# Patient Record
Sex: MALE | Race: BLACK OR AFRICAN AMERICAN | NOT HISPANIC OR LATINO | Employment: OTHER | URBAN - METROPOLITAN AREA
[De-identification: names, ages, dates, MRNs, and addresses within clinical notes are randomized per-mention and may not be internally consistent; named-entity substitution may affect disease eponyms.]

---

## 2017-08-17 ENCOUNTER — OUTSIDE FACILITY SERVICE (OUTPATIENT)
Dept: GASTROENTEROLOGY | Facility: CLINIC | Age: 67
End: 2017-08-17

## 2017-08-17 PROCEDURE — G0105 COLORECTAL SCRN; HI RISK IND: HCPCS | Performed by: INTERNAL MEDICINE

## 2017-08-17 PROCEDURE — G0500 MOD SEDAT ENDO SERVICE >5YRS: HCPCS | Performed by: INTERNAL MEDICINE

## 2017-11-17 ENCOUNTER — HOSPITAL ENCOUNTER (OUTPATIENT)
Dept: GENERAL RADIOLOGY | Facility: HOSPITAL | Age: 67
Discharge: HOME | End: 2017-11-17
Attending: FAMILY MEDICINE | Admitting: FAMILY MEDICINE

## 2017-11-17 ENCOUNTER — TRANSCRIBE ORDERS (OUTPATIENT)
Dept: ADMINISTRATIVE | Facility: HOSPITAL | Age: 67
End: 2017-11-17

## 2017-11-17 DIAGNOSIS — M54.9 BACK PAIN WITHOUT RADICULOPATHY: ICD-10-CM

## 2017-11-17 DIAGNOSIS — M25.551 ACUTE RIGHT HIP PAIN: ICD-10-CM

## 2017-11-17 DIAGNOSIS — M25.551 ACUTE RIGHT HIP PAIN: Primary | ICD-10-CM

## 2017-11-17 PROCEDURE — 73502 X-RAY EXAM HIP UNI 2-3 VIEWS: CPT

## 2017-11-17 PROCEDURE — 72110 X-RAY EXAM L-2 SPINE 4/>VWS: CPT

## 2019-03-07 ENCOUNTER — TRANSCRIBE ORDERS (OUTPATIENT)
Dept: ADMINISTRATIVE | Facility: HOSPITAL | Age: 69
End: 2019-03-07

## 2019-03-07 DIAGNOSIS — R94.39 ABNORMAL STRESS TEST: Primary | ICD-10-CM

## 2019-03-08 ENCOUNTER — HOSPITAL ENCOUNTER (OUTPATIENT)
Facility: HOSPITAL | Age: 69
LOS: 1 days | Discharge: HOME | End: 2019-03-09
Attending: INTERNAL MEDICINE | Admitting: INTERNAL MEDICINE

## 2019-03-08 DIAGNOSIS — R94.39 ABNORMAL STRESS TEST: ICD-10-CM

## 2019-03-08 LAB
ANION GAP SERPL CALCULATED.3IONS-SCNC: 9 MMOL/L (ref 3–11)
BUN BLD-MCNC: 29 MG/DL (ref 9–23)
BUN/CREAT SERPL: 19.7 (ref 7–25)
CALCIUM SPEC-SCNC: 9.1 MG/DL (ref 8.7–10.4)
CHLORIDE SERPL-SCNC: 100 MMOL/L (ref 99–109)
CO2 SERPL-SCNC: 28 MMOL/L (ref 20–31)
CREAT BLD-MCNC: 1.47 MG/DL (ref 0.6–1.3)
DEPRECATED RDW RBC AUTO: 41.5 FL (ref 37–54)
ERYTHROCYTE [DISTWIDTH] IN BLOOD BY AUTOMATED COUNT: 15.5 % (ref 11.3–14.5)
GFR SERPL CREATININE-BSD FRML MDRD: 58 ML/MIN/1.73
GLUCOSE BLD-MCNC: 160 MG/DL (ref 70–100)
GLUCOSE BLDC GLUCOMTR-MCNC: 107 MG/DL (ref 70–130)
GLUCOSE BLDC GLUCOMTR-MCNC: 163 MG/DL (ref 70–130)
GLUCOSE BLDC GLUCOMTR-MCNC: 181 MG/DL (ref 70–130)
HCT VFR BLD AUTO: 36.2 % (ref 38.9–50.9)
HGB BLD-MCNC: 11.2 G/DL (ref 13.1–17.5)
MCH RBC QN AUTO: 23 PG (ref 27–31)
MCHC RBC AUTO-ENTMCNC: 30.9 G/DL (ref 32–36)
MCV RBC AUTO: 74.3 FL (ref 80–99)
PLATELET # BLD AUTO: 247 10*3/MM3 (ref 150–450)
PMV BLD AUTO: 10.6 FL (ref 6–12)
POTASSIUM BLD-SCNC: 4 MMOL/L (ref 3.5–5.5)
RBC # BLD AUTO: 4.87 10*6/MM3 (ref 4.2–5.76)
SODIUM BLD-SCNC: 137 MMOL/L (ref 132–146)
WBC NRBC COR # BLD: 6.82 10*3/MM3 (ref 3.5–10.8)

## 2019-03-08 PROCEDURE — 36415 COLL VENOUS BLD VENIPUNCTURE: CPT

## 2019-03-08 PROCEDURE — A9270 NON-COVERED ITEM OR SERVICE: HCPCS | Performed by: INTERNAL MEDICINE

## 2019-03-08 PROCEDURE — 93459 L HRT ART/GRFT ANGIO: CPT | Performed by: INTERNAL MEDICINE

## 2019-03-08 PROCEDURE — 63710000001 LEVOTHYROXINE 75 MCG TABLET: Performed by: INTERNAL MEDICINE

## 2019-03-08 PROCEDURE — 63710000001 TERAZOSIN 1 MG CAPSULE: Performed by: INTERNAL MEDICINE

## 2019-03-08 PROCEDURE — C1769 GUIDE WIRE: HCPCS | Performed by: INTERNAL MEDICINE

## 2019-03-08 PROCEDURE — 0 IOPAMIDOL PER 1 ML: Performed by: INTERNAL MEDICINE

## 2019-03-08 PROCEDURE — C9604 PERC D-E COR REVASC T CABG S: HCPCS | Performed by: INTERNAL MEDICINE

## 2019-03-08 PROCEDURE — 92937 PRQ TRLUML REVSC CAB GRF 1: CPT | Performed by: INTERNAL MEDICINE

## 2019-03-08 PROCEDURE — 93005 ELECTROCARDIOGRAM TRACING: CPT | Performed by: INTERNAL MEDICINE

## 2019-03-08 PROCEDURE — 80048 BASIC METABOLIC PNL TOTAL CA: CPT | Performed by: INTERNAL MEDICINE

## 2019-03-08 PROCEDURE — C1725 CATH, TRANSLUMIN NON-LASER: HCPCS | Performed by: INTERNAL MEDICINE

## 2019-03-08 PROCEDURE — 63710000001 GABAPENTIN 300 MG CAPSULE: Performed by: INTERNAL MEDICINE

## 2019-03-08 PROCEDURE — 25010000002 BIVALIRUDIN 5 MG/ML: Performed by: INTERNAL MEDICINE

## 2019-03-08 PROCEDURE — C1874 STENT, COATED/COV W/DEL SYS: HCPCS | Performed by: INTERNAL MEDICINE

## 2019-03-08 PROCEDURE — 63710000001 ATORVASTATIN 40 MG TABLET: Performed by: INTERNAL MEDICINE

## 2019-03-08 PROCEDURE — 85027 COMPLETE CBC AUTOMATED: CPT | Performed by: INTERNAL MEDICINE

## 2019-03-08 PROCEDURE — 63710000001 PANTOPRAZOLE 40 MG TABLET DELAYED-RELEASE: Performed by: INTERNAL MEDICINE

## 2019-03-08 PROCEDURE — C1894 INTRO/SHEATH, NON-LASER: HCPCS | Performed by: INTERNAL MEDICINE

## 2019-03-08 PROCEDURE — 25010000002 FENTANYL CITRATE (PF) 100 MCG/2ML SOLUTION: Performed by: INTERNAL MEDICINE

## 2019-03-08 PROCEDURE — C1887 CATHETER, GUIDING: HCPCS | Performed by: INTERNAL MEDICINE

## 2019-03-08 PROCEDURE — 63710000001 OXYBUTYNIN XL 10 MG TABLET SUSTAINED-RELEASE 24 HOUR: Performed by: INTERNAL MEDICINE

## 2019-03-08 PROCEDURE — A9270 NON-COVERED ITEM OR SERVICE: HCPCS | Performed by: PHYSICIAN ASSISTANT

## 2019-03-08 PROCEDURE — 63710000001 INSULIN LISPRO (HUMAN) PER 5 UNITS: Performed by: PHYSICIAN ASSISTANT

## 2019-03-08 PROCEDURE — 82962 GLUCOSE BLOOD TEST: CPT

## 2019-03-08 DEVICE — XIENCE SIERRA™ EVEROLIMUS ELUTING CORONARY STENT SYSTEM 4.00 MM X 23 MM / RAPID-EXCHANGE
Type: IMPLANTABLE DEVICE | Status: FUNCTIONAL
Brand: XIENCE SIERRA™

## 2019-03-08 DEVICE — XIENCE SIERRA™ EVEROLIMUS ELUTING CORONARY STENT SYSTEM 4.00 MM X 28 MM / RAPID-EXCHANGE
Type: IMPLANTABLE DEVICE | Status: FUNCTIONAL
Brand: XIENCE SIERRA™

## 2019-03-08 RX ORDER — TERAZOSIN 1 MG/1
1 CAPSULE ORAL NIGHTLY
Status: DISCONTINUED | OUTPATIENT
Start: 2019-03-08 | End: 2019-03-09 | Stop reason: HOSPADM

## 2019-03-08 RX ORDER — MORPHINE SULFATE 2 MG/ML
1 INJECTION, SOLUTION INTRAMUSCULAR; INTRAVENOUS EVERY 4 HOURS PRN
Status: DISCONTINUED | OUTPATIENT
Start: 2019-03-08 | End: 2019-03-09 | Stop reason: HOSPADM

## 2019-03-08 RX ORDER — NALOXONE HCL 0.4 MG/ML
0.4 VIAL (ML) INJECTION
Status: DISCONTINUED | OUTPATIENT
Start: 2019-03-08 | End: 2019-03-09 | Stop reason: HOSPADM

## 2019-03-08 RX ORDER — OMEPRAZOLE 20 MG/1
20 CAPSULE, DELAYED RELEASE ORAL DAILY
COMMUNITY

## 2019-03-08 RX ORDER — ASPIRIN 325 MG
325 TABLET, DELAYED RELEASE (ENTERIC COATED) ORAL DAILY
Status: DISCONTINUED | OUTPATIENT
Start: 2019-03-08 | End: 2019-03-09 | Stop reason: HOSPADM

## 2019-03-08 RX ORDER — SODIUM CHLORIDE 9 MG/ML
250 INJECTION, SOLUTION INTRAVENOUS ONCE AS NEEDED
Status: DISCONTINUED | OUTPATIENT
Start: 2019-03-08 | End: 2019-03-09 | Stop reason: HOSPADM

## 2019-03-08 RX ORDER — IBUPROFEN 600 MG/1
600 TABLET ORAL EVERY 6 HOURS PRN
COMMUNITY

## 2019-03-08 RX ORDER — GABAPENTIN 300 MG/1
600 CAPSULE ORAL NIGHTLY
Status: DISCONTINUED | OUTPATIENT
Start: 2019-03-08 | End: 2019-03-09 | Stop reason: HOSPADM

## 2019-03-08 RX ORDER — TEMAZEPAM 7.5 MG/1
7.5 CAPSULE ORAL NIGHTLY PRN
Status: DISCONTINUED | OUTPATIENT
Start: 2019-03-08 | End: 2019-03-09 | Stop reason: HOSPADM

## 2019-03-08 RX ORDER — CLOPIDOGREL BISULFATE 75 MG/1
75 TABLET ORAL DAILY
Qty: 30 TABLET | Refills: 11 | Status: SHIPPED | OUTPATIENT
Start: 2019-03-09 | End: 2022-04-18 | Stop reason: HOSPADM

## 2019-03-08 RX ORDER — LEVOTHYROXINE SODIUM 0.07 MG/1
75 TABLET ORAL DAILY
Status: DISCONTINUED | OUTPATIENT
Start: 2019-03-08 | End: 2019-03-09 | Stop reason: HOSPADM

## 2019-03-08 RX ORDER — ASPIRIN 81 MG/1
81 TABLET ORAL DAILY
Status: DISCONTINUED | OUTPATIENT
Start: 2019-03-08 | End: 2019-03-08

## 2019-03-08 RX ORDER — SPIRONOLACTONE 50 MG/1
50 TABLET, FILM COATED ORAL DAILY
COMMUNITY

## 2019-03-08 RX ORDER — OXYBUTYNIN CHLORIDE 10 MG/1
10 TABLET, EXTENDED RELEASE ORAL DAILY
COMMUNITY
End: 2022-08-04 | Stop reason: ALTCHOICE

## 2019-03-08 RX ORDER — GABAPENTIN 300 MG/1
600 CAPSULE ORAL NIGHTLY PRN
COMMUNITY

## 2019-03-08 RX ORDER — ATORVASTATIN CALCIUM 80 MG/1
80 TABLET, FILM COATED ORAL NIGHTLY
Qty: 30 TABLET | Refills: 11 | Status: SHIPPED | OUTPATIENT
Start: 2019-03-08

## 2019-03-08 RX ORDER — ALPRAZOLAM 0.25 MG/1
0.25 TABLET ORAL 3 TIMES DAILY PRN
Status: DISCONTINUED | OUTPATIENT
Start: 2019-03-08 | End: 2019-03-09 | Stop reason: HOSPADM

## 2019-03-08 RX ORDER — DEXTROSE MONOHYDRATE 25 G/50ML
25 INJECTION, SOLUTION INTRAVENOUS
Status: DISCONTINUED | OUTPATIENT
Start: 2019-03-08 | End: 2019-03-09 | Stop reason: HOSPADM

## 2019-03-08 RX ORDER — GLIMEPIRIDE 2 MG/1
2 TABLET ORAL
COMMUNITY

## 2019-03-08 RX ORDER — LEVOTHYROXINE SODIUM 0.07 MG/1
75 TABLET ORAL DAILY
COMMUNITY

## 2019-03-08 RX ORDER — LIDOCAINE HYDROCHLORIDE 10 MG/ML
INJECTION, SOLUTION EPIDURAL; INFILTRATION; INTRACAUDAL; PERINEURAL AS NEEDED
Status: DISCONTINUED | OUTPATIENT
Start: 2019-03-08 | End: 2019-03-08 | Stop reason: HOSPADM

## 2019-03-08 RX ORDER — OXYBUTYNIN CHLORIDE 5 MG/1
10 TABLET, EXTENDED RELEASE ORAL DAILY
Status: DISCONTINUED | OUTPATIENT
Start: 2019-03-08 | End: 2019-03-09 | Stop reason: HOSPADM

## 2019-03-08 RX ORDER — CLOPIDOGREL BISULFATE 75 MG/1
TABLET ORAL AS NEEDED
Status: DISCONTINUED | OUTPATIENT
Start: 2019-03-08 | End: 2019-03-08 | Stop reason: HOSPADM

## 2019-03-08 RX ORDER — FENTANYL CITRATE 50 UG/ML
INJECTION, SOLUTION INTRAMUSCULAR; INTRAVENOUS AS NEEDED
Status: DISCONTINUED | OUTPATIENT
Start: 2019-03-08 | End: 2019-03-08 | Stop reason: HOSPADM

## 2019-03-08 RX ORDER — ASPIRIN 81 MG/1
81 TABLET ORAL DAILY
COMMUNITY

## 2019-03-08 RX ORDER — PANTOPRAZOLE SODIUM 40 MG/1
40 TABLET, DELAYED RELEASE ORAL EVERY MORNING
Status: DISCONTINUED | OUTPATIENT
Start: 2019-03-08 | End: 2019-03-09 | Stop reason: HOSPADM

## 2019-03-08 RX ORDER — CLOPIDOGREL BISULFATE 75 MG/1
75 TABLET ORAL DAILY
Status: DISCONTINUED | OUTPATIENT
Start: 2019-03-08 | End: 2019-03-09 | Stop reason: HOSPADM

## 2019-03-08 RX ORDER — SODIUM CHLORIDE 9 MG/ML
3 INJECTION, SOLUTION INTRAVENOUS CONTINUOUS
Status: ACTIVE | OUTPATIENT
Start: 2019-03-08 | End: 2019-03-08

## 2019-03-08 RX ORDER — ACETAMINOPHEN 325 MG/1
650 TABLET ORAL EVERY 4 HOURS PRN
Status: DISCONTINUED | OUTPATIENT
Start: 2019-03-08 | End: 2019-03-09 | Stop reason: HOSPADM

## 2019-03-08 RX ORDER — NICOTINE POLACRILEX 4 MG
15 LOZENGE BUCCAL
Status: DISCONTINUED | OUTPATIENT
Start: 2019-03-08 | End: 2019-03-09 | Stop reason: HOSPADM

## 2019-03-08 RX ORDER — HYDROCODONE BITARTRATE AND ACETAMINOPHEN 5; 325 MG/1; MG/1
1 TABLET ORAL EVERY 4 HOURS PRN
Status: DISCONTINUED | OUTPATIENT
Start: 2019-03-08 | End: 2019-03-09 | Stop reason: HOSPADM

## 2019-03-08 RX ORDER — DOXAZOSIN MESYLATE 1 MG/1
1 TABLET ORAL NIGHTLY
COMMUNITY

## 2019-03-08 RX ORDER — ATORVASTATIN CALCIUM 40 MG/1
80 TABLET, FILM COATED ORAL NIGHTLY
Status: DISCONTINUED | OUTPATIENT
Start: 2019-03-08 | End: 2019-03-09 | Stop reason: HOSPADM

## 2019-03-08 RX ADMIN — ATORVASTATIN CALCIUM 80 MG: 40 TABLET, FILM COATED ORAL at 21:07

## 2019-03-08 RX ADMIN — INSULIN LISPRO 2 UNITS: 100 INJECTION, SOLUTION INTRAVENOUS; SUBCUTANEOUS at 17:04

## 2019-03-08 RX ADMIN — ASPIRIN 325 MG: 325 TABLET, COATED ORAL at 07:22

## 2019-03-08 RX ADMIN — LEVOTHYROXINE SODIUM 75 MCG: 75 TABLET ORAL at 10:26

## 2019-03-08 RX ADMIN — PANTOPRAZOLE SODIUM 40 MG: 40 TABLET, DELAYED RELEASE ORAL at 10:26

## 2019-03-08 RX ADMIN — TERAZOSIN HYDROCHLORIDE ANHYDROUS 1 MG: 1 CAPSULE ORAL at 21:06

## 2019-03-08 RX ADMIN — OXYBUTYNIN CHLORIDE 10 MG: 10 TABLET, EXTENDED RELEASE ORAL at 10:26

## 2019-03-08 RX ADMIN — INSULIN LISPRO 2 UNITS: 100 INJECTION, SOLUTION INTRAVENOUS; SUBCUTANEOUS at 21:07

## 2019-03-08 RX ADMIN — GABAPENTIN 600 MG: 300 CAPSULE ORAL at 21:07

## 2019-03-08 NOTE — PLAN OF CARE
Problem: Patient Care Overview  Goal: Plan of Care Review  Outcome: Ongoing (interventions implemented as appropriate)   03/08/19 1098   Coping/Psychosocial   Plan of Care Reviewed With patient   Plan of Care Review   Progress improving   OTHER   Outcome Summary Doing well s/p heart cath, right femoral site soft after manual pressure applied for a few minutes. Will continue to monitor.      Goal: Individualization and Mutuality  Outcome: Ongoing (interventions implemented as appropriate)    Goal: Discharge Needs Assessment  Outcome: Ongoing (interventions implemented as appropriate)    Goal: Interprofessional Rounds/Family Conf  Outcome: Ongoing (interventions implemented as appropriate)      Problem: Cardiac Catheterization (Diagnostic/Interventional) (Adult)  Goal: Signs and Symptoms of Listed Potential Problems Will be Absent, Minimized or Managed (Cardiac Catheterization)  Outcome: Ongoing (interventions implemented as appropriate)    Goal: Anesthesia/Sedation Recovery  Outcome: Ongoing (interventions implemented as appropriate)

## 2019-03-08 NOTE — H&P
Pre-Cardiac Catheterization Report  Cardiovascular Laboratory  [facility]      Patient:  Saulo Dahl  :  1950  PCP:  Han Ramirez MD  PHONE:  [phone]    DATE: 3/8/2019    BRIEF HPI:  Saulo Dahl is a 68 y.o. male with hypertension, hypercholesterolemia, diabetes mellitus, and known coronary artery disease.  He is post CABG from .  He is complaining of a 3-month history of chest pain which she describes as a heaviness.  He states that it can last hours and is associated with fatigue, shortness of breath, and dyspnea on exertion.  He recently underwent an abnormal stress test and now presents for left heart catheterization with possible intervention.    Cardiac Risk Factors:  advanced age (older than 55 for men, 65 for women), diabetes mellitus, dyslipidemia, family history of premature cardiovascular disease, hypertension, male gender, obesity (BMI >= 30 kg/m2)    Anginal class in last 2 weeks:  CCS class III    CHF Class in last 2 weeks:  NYHA Class II    Cardiogenic shock:  no    Cardiac arrest <24 hours:  no    Stress test within last 6 months:   yes   Details:    Previous cardiac catheterization:  yes  Details:     Previous CABG:  yes  Details:      Allergies:     IV contrast allergy:  no  Allergies   Allergen Reactions   • Lactose Intolerance (Gi) GI Intolerance       MEDICATIONS:  Prior to Admission medications    Medication Sig Start Date End Date Taking? Authorizing Provider   aspirin 81 MG EC tablet Take 81 mg by mouth Daily.   Yes Kuldeep Solis MD   Cholecalciferol (VITAMIN D PO) Take 1 capsule by mouth Daily.   Yes Kuldeep Solis MD   Cyanocobalamin 1000 MCG/ML kit Inject 1 dose as directed 1 (One) Time Per Week.   Yes Kuldeep Solis MD   doxazosin (CARDURA) 1 MG tablet Take 1 mg by mouth Every Night.   Yes Kuldeep Solis MD   gabapentin (NEURONTIN) 300 MG capsule Take 600 mg by mouth At Night As Needed.   Yes Kuldeep Solis MD    glimepiride (AMARYL) 2 MG tablet Take 2 mg by mouth Every Morning Before Breakfast.   Yes Kuldeep Solis MD   ibuprofen (ADVIL,MOTRIN) 600 MG tablet Take 600 mg by mouth Every 6 (Six) Hours As Needed for Mild Pain .   Yes Kuldeep Solis MD   levothyroxine (SYNTHROID, LEVOTHROID) 75 MCG tablet Take 75 mcg by mouth Daily.   Yes Kuldeep Solis MD   metFORMIN (GLUCOPHAGE) 500 MG tablet Take 500 mg by mouth 2 (Two) Times a Day With Meals.   Yes Kuldeep Solis MD   Omega-3 Fatty Acids (FISH OIL PO) Take 57 mg by mouth Daily.   Yes Kuldeep Solis MD   omeprazole (priLOSEC) 20 MG capsule Take 20 mg by mouth Daily.   Yes Kuldepe Solis MD   oxybutynin XL (DITROPAN-XL) 10 MG 24 hr tablet Take 10 mg by mouth Daily.   Yes Kuldeep Solis MD   spironolactone (ALDACTONE) 50 MG tablet Take 50 mg by mouth Daily.   Yes Kuldeep Solis MD       Past medical & surgical history, social and family history reviewed in the electronic medical record.    ROS:  Cardiovascular ROS: positive for - chest pain, dyspnea on exertion and shortness of breath    Physical Exam:    Vitals:   Vitals:    03/08/19 0632   BP: 149/80   Pulse:    Resp:    Temp:    SpO2: 96%          03/08/19  0630   Weight: 99.8 kg (220 lb 0.3 oz)       General Appearance:    Alert, cooperative, in no acute distress   Head:    Normocephalic, without obvious abnormality, atraumatic   Eyes:            Lids and lashes normal, conjunctivae and sclerae normal, no   icterus, no pallor, corneas clear, PERRLA   Ears:    Ears appear intact with no abnormalities noted   Neck:   No adenopathy, supple, trachea midline, no thyromegaly, no   carotid bruit, no JVD   Back:     No kyphosis present, no scoliosis present, range of motion normal   Lungs:     Clear to auscultation,respirations regular, even and                  unlabored    Heart:    Regular rhythm and normal rate, normal S1 and S2, no            murmur, no gallop, no rub,  no click   Chest Wall:    No abnormalities observed   Abdomen:     Normal bowel sounds, no masses, no organomegaly, soft        non-tender, non-distended, no guarding, no rebound                tenderness   Rectal:     Deferred   Extremities:   Moves all extremities well, no edema, no cyanosis, no             redness   Pulses:   Pulses palpable and equal bilaterally   Skin:   No bleeding, bruising or rash   Neurologic:   Cranial nerves 2 - 12 grossly intact, sensation intact     Barbaeu Test:  Left: Normal  (oxymetric Allens) Right: Not Assessed         Results from last 7 days   Lab Units 03/08/19  0634   WBC 10*3/mm3 6.82   HEMOGLOBIN g/dL 11.2*   HEMATOCRIT % 36.2*   PLATELETS 10*3/mm3 247     Lab Results   Component Value Date    AST 25 04/17/2015    ALT 25 04/17/2015           Impression      · Abnormal stress test    Plan     · Procedure to perform: [facility]  · Planned access: Left radial artery              FLACA Rollins  03/08/19  7:13 AM

## 2019-03-09 VITALS
OXYGEN SATURATION: 97 % | WEIGHT: 220.02 LBS | SYSTOLIC BLOOD PRESSURE: 141 MMHG | TEMPERATURE: 98 F | HEIGHT: 68 IN | BODY MASS INDEX: 33.35 KG/M2 | RESPIRATION RATE: 16 BRPM | HEART RATE: 60 BPM | DIASTOLIC BLOOD PRESSURE: 73 MMHG

## 2019-03-09 LAB
ANION GAP SERPL CALCULATED.3IONS-SCNC: 7 MMOL/L (ref 3–11)
BUN BLD-MCNC: 26 MG/DL (ref 9–23)
BUN/CREAT SERPL: 21.1 (ref 7–25)
CALCIUM SPEC-SCNC: 8.8 MG/DL (ref 8.7–10.4)
CHLORIDE SERPL-SCNC: 102 MMOL/L (ref 99–109)
CO2 SERPL-SCNC: 30 MMOL/L (ref 20–31)
CREAT BLD-MCNC: 1.23 MG/DL (ref 0.6–1.3)
GFR SERPL CREATININE-BSD FRML MDRD: 71 ML/MIN/1.73
GLUCOSE BLD-MCNC: 162 MG/DL (ref 70–100)
GLUCOSE BLDC GLUCOMTR-MCNC: 155 MG/DL (ref 70–130)
POTASSIUM BLD-SCNC: 4.4 MMOL/L (ref 3.5–5.5)
SODIUM BLD-SCNC: 139 MMOL/L (ref 132–146)

## 2019-03-09 PROCEDURE — A9270 NON-COVERED ITEM OR SERVICE: HCPCS | Performed by: INTERNAL MEDICINE

## 2019-03-09 PROCEDURE — 63710000001 OXYBUTYNIN XL 5 MG TABLET SUSTAINED-RELEASE 24 HOUR: Performed by: INTERNAL MEDICINE

## 2019-03-09 PROCEDURE — A9270 NON-COVERED ITEM OR SERVICE: HCPCS | Performed by: PHYSICIAN ASSISTANT

## 2019-03-09 PROCEDURE — 82962 GLUCOSE BLOOD TEST: CPT

## 2019-03-09 PROCEDURE — 63710000001 CLOPIDOGREL 75 MG TABLET: Performed by: INTERNAL MEDICINE

## 2019-03-09 PROCEDURE — 80048 BASIC METABOLIC PNL TOTAL CA: CPT | Performed by: PHYSICIAN ASSISTANT

## 2019-03-09 PROCEDURE — 63710000001 LEVOTHYROXINE 75 MCG TABLET: Performed by: INTERNAL MEDICINE

## 2019-03-09 PROCEDURE — 63710000001 PANTOPRAZOLE 40 MG TABLET DELAYED-RELEASE: Performed by: INTERNAL MEDICINE

## 2019-03-09 PROCEDURE — 63710000001 ASPIRIN 325 MG TABLET DELAYED-RELEASE: Performed by: PHYSICIAN ASSISTANT

## 2019-03-09 RX ADMIN — LEVOTHYROXINE SODIUM 75 MCG: 75 TABLET ORAL at 08:30

## 2019-03-09 RX ADMIN — CLOPIDOGREL BISULFATE 75 MG: 75 TABLET ORAL at 08:30

## 2019-03-09 RX ADMIN — OXYBUTYNIN CHLORIDE 10 MG: 10 TABLET, EXTENDED RELEASE ORAL at 08:30

## 2019-03-09 RX ADMIN — ASPIRIN 325 MG: 325 TABLET, COATED ORAL at 08:30

## 2019-03-09 RX ADMIN — PANTOPRAZOLE SODIUM 40 MG: 40 TABLET, DELAYED RELEASE ORAL at 06:32

## 2019-03-09 RX ADMIN — INSULIN LISPRO 2 UNITS: 100 INJECTION, SOLUTION INTRAVENOUS; SUBCUTANEOUS at 08:30

## 2019-03-09 NOTE — PLAN OF CARE
Problem: Patient Care Overview  Goal: Plan of Care Review  Outcome: Ongoing (interventions implemented as appropriate)   03/09/19 0519   Coping/Psychosocial   Plan of Care Reviewed With patient   Plan of Care Review   Progress improving   OTHER   Outcome Summary Pt rested well this shift. VSS. RA. Sinus sabrina. No reports of pain this shift. Right groin site CDI.

## 2019-03-09 NOTE — DISCHARGE SUMMARY
Date of Discharge:  3/9/2019              [facility]  Date of Admit: 3/8/2019    Han Ramirez MD      Discharge Diagnosis:  Abnormal stress test      Hospital Course: Mr. Dahl is a pleasant 68-year-old male who was admitted to [facility] on 3/8/2019 due to an abnormal stress test.  He underwent a heart catheterization and received 2 drug-eluting stents to the saphenous vein graft to the circumflex coronary artery.  He tolerated the procedure well, there were no complications.  Today he is denying any complaints and is therefore ready for discharge.    Procedures Performed  Procedure(s):  Left Heart Cath       Consults     No orders found for last 30 day(s).          Pertinent Test Results: angiography: BAYRON x 2 to SVG-CX  .      Discharge Physical Exam:    General Appearance No acute distress   Neck No adenopathy, supple, trachea midline, no JVD   Lungs Clear to auscultation,respirations regular, even and unlabored   Heart Regular rhythm and normal rate, normal S1 and S2, no murmur, no gallop, no rub, no click   Chest wall No abnormalities observed   Abdomen Normal bowel sounds, no masses, no hepatomegaly, soft   Extremities Moves all extremities well, no edema, no cyanosis, no redness, right groin stable   Neurological Alert and oriented x 3     Discharge Medications     Discharge Medications      New Medications      Instructions Start Date   atorvastatin 80 MG tablet  Commonly known as:  LIPITOR   80 mg, Oral, Nightly      clopidogrel 75 MG tablet  Commonly known as:  PLAVIX   75 mg, Oral, Daily         Continue These Medications      Instructions Start Date   aspirin 81 MG EC tablet   81 mg, Oral, Daily      doxazosin 1 MG tablet  Commonly known as:  CARDURA   1 mg, Oral, Nightly      FISH OIL PO   57 mg, Oral, Daily      gabapentin 300 MG capsule  Commonly known as:  NEURONTIN   600 mg, Oral, Nightly PRN      glimepiride 2 MG tablet  Commonly known as:  AMARYL   2 mg, Oral,  Every Morning Before Breakfast      ibuprofen 600 MG tablet  Commonly known as:  ADVIL,MOTRIN   600 mg, Oral, Every 6 Hours PRN      levothyroxine 75 MCG tablet  Commonly known as:  SYNTHROID, LEVOTHROID   75 mcg, Oral, Daily      metFORMIN 500 MG tablet  Commonly known as:  GLUCOPHAGE   500 mg, Oral, 2 Times Daily With Meals      omeprazole 20 MG capsule  Commonly known as:  priLOSEC   20 mg, Oral, Daily      oxybutynin XL 10 MG 24 hr tablet  Commonly known as:  DITROPAN-XL   10 mg, Oral, Daily      spironolactone 50 MG tablet  Commonly known as:  ALDACTONE   50 mg, Oral, Daily      VITAMIN D PO   1 capsule, Oral, Daily             Discharge Diet: cardiac/diabetic    Activity at Discharge: as tolerated    Discharge disposition: home    Condition on Discharge: stable    Follow-up Appointments  No future appointments.  Additional Instructions for the Follow-ups that You Need to Schedule     Discharge Follow-up with Specified Provider: Dr. Smith; 1 Month   As directed      To:  Dr. Smith    Follow Up:  1 Month                      FLACA Rollins  03/09/19  10:53 AM

## 2019-03-11 ENCOUNTER — DOCUMENTATION (OUTPATIENT)
Dept: CARDIAC REHAB | Facility: HOSPITAL | Age: 69
End: 2019-03-11

## 2019-04-09 ENCOUNTER — DOCUMENTATION (OUTPATIENT)
Dept: CARDIAC REHAB | Facility: HOSPITAL | Age: 69
End: 2019-04-09

## 2021-12-16 ENCOUNTER — HOSPITAL ENCOUNTER (OUTPATIENT)
Dept: GENERAL RADIOLOGY | Facility: HOSPITAL | Age: 71
Discharge: HOME | End: 2021-12-16
Admitting: FAMILY MEDICINE

## 2021-12-16 ENCOUNTER — TRANSCRIBE ORDERS (OUTPATIENT)
Dept: ADMINISTRATIVE | Facility: HOSPITAL | Age: 71
End: 2021-12-16

## 2021-12-16 DIAGNOSIS — M25.551 HIP PAIN, RIGHT: ICD-10-CM

## 2021-12-16 DIAGNOSIS — M25.551 HIP PAIN, RIGHT: Primary | ICD-10-CM

## 2021-12-16 PROCEDURE — 73522 X-RAY EXAM HIPS BI 3-4 VIEWS: CPT

## 2022-04-16 ENCOUNTER — APPOINTMENT (OUTPATIENT)
Dept: GENERAL RADIOLOGY | Facility: HOSPITAL | Age: 72
End: 2022-04-16

## 2022-04-16 ENCOUNTER — APPOINTMENT (OUTPATIENT)
Dept: CT IMAGING | Facility: HOSPITAL | Age: 72
End: 2022-04-16

## 2022-04-16 ENCOUNTER — HOSPITAL ENCOUNTER (OUTPATIENT)
Facility: HOSPITAL | Age: 72
Setting detail: OBSERVATION
Discharge: HOME | End: 2022-04-18
Attending: EMERGENCY MEDICINE | Admitting: INTERNAL MEDICINE

## 2022-04-16 ENCOUNTER — APPOINTMENT (OUTPATIENT)
Dept: MRI IMAGING | Facility: HOSPITAL | Age: 72
End: 2022-04-16

## 2022-04-16 DIAGNOSIS — R47.01 APHASIA: Primary | ICD-10-CM

## 2022-04-16 DIAGNOSIS — R51.9 NONINTRACTABLE HEADACHE, UNSPECIFIED CHRONICITY PATTERN, UNSPECIFIED HEADACHE TYPE: ICD-10-CM

## 2022-04-16 DIAGNOSIS — I49.9 IRREGULAR CARDIAC RHYTHM: ICD-10-CM

## 2022-04-16 DIAGNOSIS — H53.9 VISUAL DISTURBANCE: ICD-10-CM

## 2022-04-16 PROBLEM — I25.10 CAD (CORONARY ARTERY DISEASE): Status: ACTIVE | Noted: 2022-04-16

## 2022-04-16 PROBLEM — D64.9 ANEMIA: Status: ACTIVE | Noted: 2022-04-16

## 2022-04-16 PROBLEM — R79.89 ELEVATED TROPONIN: Status: ACTIVE | Noted: 2022-04-16

## 2022-04-16 PROBLEM — R77.8 ELEVATED TROPONIN: Status: ACTIVE | Noted: 2022-04-16

## 2022-04-16 PROBLEM — Z86.69 HX OF MIGRAINES: Status: ACTIVE | Noted: 2022-04-16

## 2022-04-16 PROBLEM — K21.9 GERD WITHOUT ESOPHAGITIS: Status: ACTIVE | Noted: 2022-04-16

## 2022-04-16 PROBLEM — I10 HTN (HYPERTENSION): Status: ACTIVE | Noted: 2022-04-16

## 2022-04-16 PROBLEM — E78.5 HLD (HYPERLIPIDEMIA): Status: ACTIVE | Noted: 2022-04-16

## 2022-04-16 PROBLEM — E11.9 T2DM (TYPE 2 DIABETES MELLITUS): Status: ACTIVE | Noted: 2022-04-16

## 2022-04-16 PROBLEM — E03.9 HYPOTHYROIDISM (ACQUIRED): Status: ACTIVE | Noted: 2022-04-16

## 2022-04-16 PROBLEM — R94.39 ABNORMAL STRESS TEST: Status: RESOLVED | Noted: 2019-03-07 | Resolved: 2022-04-16

## 2022-04-16 LAB
ALT SERPL W P-5'-P-CCNC: 23 U/L (ref 1–41)
APTT PPP: 30.4 SECONDS (ref 22–39)
AST SERPL-CCNC: 37 U/L (ref 1–40)
BASOPHILS # BLD AUTO: 0.04 10*3/MM3 (ref 0–0.2)
BASOPHILS NFR BLD AUTO: 0.7 % (ref 0–1.5)
BUN BLDA-MCNC: 17 MG/DL (ref 8–26)
CA-I BLDA-SCNC: 1.24 MMOL/L (ref 1.2–1.32)
CHLORIDE BLDA-SCNC: 99 MMOL/L (ref 98–109)
CO2 BLDA-SCNC: 27 MMOL/L (ref 24–29)
CREAT BLDA-MCNC: 1.1 MG/DL (ref 0.6–1.3)
CREAT BLDA-MCNC: 1.2 MG/DL (ref 0.6–1.3)
DEPRECATED RDW RBC AUTO: 40.4 FL (ref 37–54)
EGFRCR SERPLBLD CKD-EPI 2021: 63.9 ML/MIN/1.73
EOSINOPHIL # BLD AUTO: 0.3 10*3/MM3 (ref 0–0.4)
EOSINOPHIL NFR BLD AUTO: 5.1 % (ref 0.3–6.2)
ERYTHROCYTE [DISTWIDTH] IN BLOOD BY AUTOMATED COUNT: 15.8 % (ref 12.3–15.4)
GLUCOSE BLDC GLUCOMTR-MCNC: 122 MG/DL (ref 70–130)
HCT VFR BLD AUTO: 33.9 % (ref 37.5–51)
HCT VFR BLDA CALC: 38 % (ref 38–51)
HGB BLD-MCNC: 10.7 G/DL (ref 13–17.7)
HGB BLDA-MCNC: 12.9 G/DL (ref 12–17)
HOLD SPECIMEN: NORMAL
HYPOCHROMIA BLD QL: NORMAL
IMM GRANULOCYTES # BLD AUTO: 0.02 10*3/MM3 (ref 0–0.05)
IMM GRANULOCYTES NFR BLD AUTO: 0.3 % (ref 0–0.5)
LYMPHOCYTES # BLD AUTO: 3.46 10*3/MM3 (ref 0.7–3.1)
LYMPHOCYTES NFR BLD AUTO: 58.5 % (ref 19.6–45.3)
MCH RBC QN AUTO: 23 PG (ref 26.6–33)
MCHC RBC AUTO-ENTMCNC: 31.6 G/DL (ref 31.5–35.7)
MCV RBC AUTO: 72.7 FL (ref 79–97)
MONOCYTES # BLD AUTO: 0.41 10*3/MM3 (ref 0.1–0.9)
MONOCYTES NFR BLD AUTO: 6.9 % (ref 5–12)
NEUTROPHILS NFR BLD AUTO: 1.68 10*3/MM3 (ref 1.7–7)
NEUTROPHILS NFR BLD AUTO: 28.5 % (ref 42.7–76)
NRBC BLD AUTO-RTO: 0 /100 WBC (ref 0–0.2)
PA ADP PRP-ACNC: 290 PRU
PLAT MORPH BLD: NORMAL
PLATELET # BLD AUTO: 190 10*3/MM3 (ref 140–450)
PMV BLD AUTO: 11 FL (ref 6–12)
POTASSIUM BLDA-SCNC: 3.9 MMOL/L (ref 3.5–4.9)
RBC # BLD AUTO: 4.66 10*6/MM3 (ref 4.14–5.8)
SODIUM BLD-SCNC: 138 MMOL/L (ref 138–146)
TROPONIN T SERPL-MCNC: 0.04 NG/ML (ref 0–0.03)
TROPONIN T SERPL-MCNC: 0.04 NG/ML (ref 0–0.03)
WBC MORPH BLD: NORMAL
WBC NRBC COR # BLD: 5.91 10*3/MM3 (ref 3.4–10.8)
WHOLE BLOOD HOLD SPECIMEN: NORMAL
WHOLE BLOOD HOLD SPECIMEN: NORMAL

## 2022-04-16 PROCEDURE — 70450 CT HEAD/BRAIN W/O DYE: CPT

## 2022-04-16 PROCEDURE — 85652 RBC SED RATE AUTOMATED: CPT | Performed by: INTERNAL MEDICINE

## 2022-04-16 PROCEDURE — 80047 BASIC METABLC PNL IONIZED CA: CPT

## 2022-04-16 PROCEDURE — 93005 ELECTROCARDIOGRAM TRACING: CPT

## 2022-04-16 PROCEDURE — 0 IOPAMIDOL PER 1 ML: Performed by: EMERGENCY MEDICINE

## 2022-04-16 PROCEDURE — 99204 OFFICE O/P NEW MOD 45 MIN: CPT

## 2022-04-16 PROCEDURE — 70551 MRI BRAIN STEM W/O DYE: CPT

## 2022-04-16 PROCEDURE — G0378 HOSPITAL OBSERVATION PER HR: HCPCS

## 2022-04-16 PROCEDURE — 85007 BL SMEAR W/DIFF WBC COUNT: CPT | Performed by: EMERGENCY MEDICINE

## 2022-04-16 PROCEDURE — 99285 EMERGENCY DEPT VISIT HI MDM: CPT

## 2022-04-16 PROCEDURE — 84450 TRANSFERASE (AST) (SGOT): CPT | Performed by: EMERGENCY MEDICINE

## 2022-04-16 PROCEDURE — 70496 CT ANGIOGRAPHY HEAD: CPT

## 2022-04-16 PROCEDURE — 84460 ALANINE AMINO (ALT) (SGPT): CPT | Performed by: EMERGENCY MEDICINE

## 2022-04-16 PROCEDURE — 82565 ASSAY OF CREATININE: CPT

## 2022-04-16 PROCEDURE — 0042T HC CT CEREBRAL PERFUSION W/WO CONTRAST: CPT

## 2022-04-16 PROCEDURE — 85576 BLOOD PLATELET AGGREGATION: CPT

## 2022-04-16 PROCEDURE — 85014 HEMATOCRIT: CPT

## 2022-04-16 PROCEDURE — 84484 ASSAY OF TROPONIN QUANT: CPT | Performed by: NURSE PRACTITIONER

## 2022-04-16 PROCEDURE — 99220 PR INITIAL OBSERVATION CARE/DAY 70 MINUTES: CPT | Performed by: INTERNAL MEDICINE

## 2022-04-16 PROCEDURE — 70498 CT ANGIOGRAPHY NECK: CPT

## 2022-04-16 PROCEDURE — 85025 COMPLETE CBC W/AUTO DIFF WBC: CPT | Performed by: EMERGENCY MEDICINE

## 2022-04-16 PROCEDURE — 93005 ELECTROCARDIOGRAM TRACING: CPT | Performed by: EMERGENCY MEDICINE

## 2022-04-16 PROCEDURE — 71045 X-RAY EXAM CHEST 1 VIEW: CPT

## 2022-04-16 PROCEDURE — 84484 ASSAY OF TROPONIN QUANT: CPT | Performed by: EMERGENCY MEDICINE

## 2022-04-16 PROCEDURE — 85730 THROMBOPLASTIN TIME PARTIAL: CPT | Performed by: EMERGENCY MEDICINE

## 2022-04-16 RX ORDER — NICOTINE POLACRILEX 4 MG
15 LOZENGE BUCCAL
Status: DISCONTINUED | OUTPATIENT
Start: 2022-04-16 | End: 2022-04-18 | Stop reason: HOSPADM

## 2022-04-16 RX ORDER — SODIUM CHLORIDE 0.9 % (FLUSH) 0.9 %
10 SYRINGE (ML) INJECTION EVERY 12 HOURS SCHEDULED
Status: DISCONTINUED | OUTPATIENT
Start: 2022-04-16 | End: 2022-04-18 | Stop reason: HOSPADM

## 2022-04-16 RX ORDER — TERAZOSIN 1 MG/1
1 CAPSULE ORAL NIGHTLY
Status: DISCONTINUED | OUTPATIENT
Start: 2022-04-17 | End: 2022-04-18 | Stop reason: HOSPADM

## 2022-04-16 RX ORDER — SODIUM CHLORIDE 9 MG/ML
75 INJECTION, SOLUTION INTRAVENOUS CONTINUOUS
Status: ACTIVE | OUTPATIENT
Start: 2022-04-16 | End: 2022-04-17

## 2022-04-16 RX ORDER — CLOPIDOGREL BISULFATE 75 MG/1
75 TABLET ORAL DAILY
Status: DISCONTINUED | OUTPATIENT
Start: 2022-04-17 | End: 2022-04-17

## 2022-04-16 RX ORDER — SODIUM CHLORIDE 0.9 % (FLUSH) 0.9 %
10 SYRINGE (ML) INJECTION AS NEEDED
Status: DISCONTINUED | OUTPATIENT
Start: 2022-04-16 | End: 2022-04-18 | Stop reason: HOSPADM

## 2022-04-16 RX ORDER — LEVOTHYROXINE SODIUM 0.07 MG/1
75 TABLET ORAL
Status: DISCONTINUED | OUTPATIENT
Start: 2022-04-17 | End: 2022-04-18 | Stop reason: HOSPADM

## 2022-04-16 RX ORDER — ATORVASTATIN CALCIUM 40 MG/1
80 TABLET, FILM COATED ORAL NIGHTLY
Status: DISCONTINUED | OUTPATIENT
Start: 2022-04-16 | End: 2022-04-18 | Stop reason: HOSPADM

## 2022-04-16 RX ORDER — DEXTROSE MONOHYDRATE 25 G/50ML
25 INJECTION, SOLUTION INTRAVENOUS
Status: DISCONTINUED | OUTPATIENT
Start: 2022-04-16 | End: 2022-04-18 | Stop reason: HOSPADM

## 2022-04-16 RX ORDER — ACETAMINOPHEN 325 MG/1
650 TABLET ORAL EVERY 4 HOURS PRN
Status: DISCONTINUED | OUTPATIENT
Start: 2022-04-16 | End: 2022-04-18 | Stop reason: HOSPADM

## 2022-04-16 RX ORDER — OXYBUTYNIN CHLORIDE 10 MG/1
10 TABLET, EXTENDED RELEASE ORAL DAILY
Status: DISCONTINUED | OUTPATIENT
Start: 2022-04-17 | End: 2022-04-18 | Stop reason: HOSPADM

## 2022-04-16 RX ORDER — ASPIRIN 81 MG/1
81 TABLET, CHEWABLE ORAL DAILY
Status: DISCONTINUED | OUTPATIENT
Start: 2022-04-17 | End: 2022-04-18 | Stop reason: HOSPADM

## 2022-04-16 RX ORDER — ASPIRIN 300 MG/1
300 SUPPOSITORY RECTAL DAILY
Status: DISCONTINUED | OUTPATIENT
Start: 2022-04-17 | End: 2022-04-18 | Stop reason: HOSPADM

## 2022-04-16 RX ORDER — PANTOPRAZOLE SODIUM 40 MG/1
40 TABLET, DELAYED RELEASE ORAL EVERY MORNING
Status: DISCONTINUED | OUTPATIENT
Start: 2022-04-17 | End: 2022-04-18 | Stop reason: HOSPADM

## 2022-04-16 RX ADMIN — IOPAMIDOL 115 ML: 755 INJECTION, SOLUTION INTRAVENOUS at 18:24

## 2022-04-16 RX ADMIN — SODIUM CHLORIDE 75 ML/HR: 9 INJECTION, SOLUTION INTRAVENOUS at 21:20

## 2022-04-16 RX ADMIN — ATORVASTATIN CALCIUM 80 MG: 40 TABLET, FILM COATED ORAL at 21:26

## 2022-04-16 NOTE — ED PROVIDER NOTES
Subjective   Patient is a pleasant 72-year-old male who presents with concern for stroke.  He says history of migraine headaches and says that even since he was a child he has vision changes when he has migraine headaches.  He had a headache this morning did have associated vision changes but he says that this was worse than usual.  More concerningly and what prompted his visit to the emergency department was that was prior to arrival he went to his wife and she states that he was very confused and could not seem to comprehend what she was saying to him.  He would try to speak and it was a word salad rodriberish.  He has never had any symptoms like this associated with his complex migraines in the past.  Denies recent fever or unknown illness.  He feels that his symptoms have resolved at this time.    Denies fever chest pain, shortness of breath, abdominal pain, vomiting, or diarrhea.      Stroke  Last known well:  Last night.  Vision changes and headache upon waking.  Aphasia symptoms began at 1700.  Timing:  Rare  Progression:  Resolved  Previous similar episodes: Vision changes are consistent with previous episodes.  He has never had the aphasia symptoms.    Associated symptoms: no chest pain, no fall, no fever, no seizures and no vomiting        Review of Systems   Constitutional: Negative for fever.   Cardiovascular: Negative for chest pain.   Gastrointestinal: Negative for vomiting.   Neurological: Negative for seizures.   All other systems reviewed and are negative.      Past Medical History:   Diagnosis Date   • Coronary artery disease    • Diabetes mellitus (HCC)    • Disease of thyroid gland    • Hypercholesteremia    • Hypertension        Allergies   Allergen Reactions   • Lactose Intolerance (Gi) GI Intolerance       Past Surgical History:   Procedure Laterality Date   • ABDOMINAL HERNIA REPAIR     • CARDIAC CATHETERIZATION     • CARDIAC CATHETERIZATION N/A 3/8/2019    Procedure: Left Heart Cath;  Surgeon:  Jamshid Smith MD;  Location: [facility] INVASIVE LOCATION;  Service: Cardiology   • CATARACT EXTRACTION Right    • CIRCUMCISION     • CORONARY ARTERY BYPASS GRAFT     • TONSILLECTOMY     • TOTAL HIP ARTHROPLASTY Right        History reviewed. No pertinent family history.    Social History     Socioeconomic History   • Marital status:    Tobacco Use   • Smoking status: Never Smoker   • Smokeless tobacco: Never Used   Substance and Sexual Activity   • Alcohol use: No   • Drug use: No   • Sexual activity: Defer           Objective   Physical Exam  Vitals and nursing note reviewed.   Constitutional:       General: He is not in acute distress.  HENT:      Head: Normocephalic and atraumatic.   Eyes:      Conjunctiva/sclera: Conjunctivae normal.      Pupils: Pupils are equal, round, and reactive to light.   Cardiovascular:      Rate and Rhythm: Normal rate. Rhythm irregular.      Heart sounds: Normal heart sounds.   Pulmonary:      Effort: No respiratory distress.      Breath sounds: Normal breath sounds.   Abdominal:      Palpations: Abdomen is soft.      Tenderness: There is no abdominal tenderness.   Musculoskeletal:         General: Normal range of motion.      Cervical back: Normal range of motion and neck supple.   Skin:     General: Skin is warm and dry.   Neurological:      Mental Status: He is alert and oriented to person, place, and time.      Cranial Nerves: No cranial nerve deficit.      Sensory: No sensory deficit.      Comments: No pronator drift   Normal finger to nose and heel to shin    Psychiatric:         Mood and Affect: Mood normal.         Behavior: Behavior normal.         Procedures           ED Course      Recent Results (from the past 24 hour(s))   POC Creatinine    Collection Time: 04/16/22  6:10 PM    Specimen: Blood   Result Value Ref Range    Creatinine 1.10 0.60 - 1.30 mg/dL   Troponin    Collection Time: 04/16/22  6:12 PM    Specimen: Blood   Result Value Ref Range    Troponin  T 0.039 (C) 0.000 - 0.030 ng/mL   AST    Collection Time: 04/16/22  6:12 PM    Specimen: Blood   Result Value Ref Range    AST (SGOT) 37 1 - 40 U/L   ALT    Collection Time: 04/16/22  6:12 PM    Specimen: Blood   Result Value Ref Range    ALT (SGPT) 23 1 - 41 U/L   CBC Auto Differential    Collection Time: 04/16/22  6:12 PM    Specimen: Blood   Result Value Ref Range    WBC 5.91 3.40 - 10.80 10*3/mm3    RBC 4.66 4.14 - 5.80 10*6/mm3    Hemoglobin 10.7 (L) 13.0 - 17.7 g/dL    Hematocrit 33.9 (L) 37.5 - 51.0 %    MCV 72.7 (L) 79.0 - 97.0 fL    MCH 23.0 (L) 26.6 - 33.0 pg    MCHC 31.6 31.5 - 35.7 g/dL    RDW 15.8 (H) 12.3 - 15.4 %    RDW-SD 40.4 37.0 - 54.0 fl    MPV 11.0 6.0 - 12.0 fL    Platelets 190 140 - 450 10*3/mm3    nRBC 0.0 0.0 - 0.2 /100 WBC   POC CHEM 8    Collection Time: 04/16/22  6:17 PM    Specimen: Blood   Result Value Ref Range    Glucose 122 70 - 130 mg/dL    BUN 17 8 - 26 mg/dL    Creatinine 1.20 0.60 - 1.30 mg/dL    Sodium 138 138 - 146 mmol/L    POC Potassium 3.9 3.5 - 4.9 mmol/L    Chloride 99 98 - 109 mmol/L    Total CO2 27 24 - 29 mmol/L    Hemoglobin 12.9 12.0 - 17.0 g/dL    Hematocrit 38 38 - 51 %    Ionized Calcium 1.24 1.20 - 1.32 mmol/L    eGFR 63.9 >60.0 mL/min/1.73     Note: In addition to lab results from this visit, the labs listed above may include labs taken at another facility or during a different encounter within the last 24 hours. Please correlate lab times with ED admission and discharge times for further clarification of the services performed during this visit.    XR Chest 1 View   Final Result       1. No acute cardiopulmonary disease.           This report was finalized on 4/16/2022 6:41 PM by Jesse Bucio MD.          CT Angiogram Head w AI Analysis of LVO   Final Result       1.  Calcified plaque the left carotid bifurcation resulting in   approximately 50% stenosis of the left internal carotid artery at its   origin.   2.  No hemodynamically significant stenosis of the  "right carotid or   vertebral arteries.   3.  No intracranial vascular stenosis, occlusion, or aneurysm.   4.  No pathologic contrast enhancement.       This report was finalized on 4/16/2022 6:35 PM by Jesse Bucio MD.          CT CEREBRAL PERFUSION WITH & WITHOUT CONTRAST   Final Result           1.  No perfusion abnormality seen to suggest ischemia or core infarct.       This report was finalized on 4/16/2022 6:25 PM by Jesse Bucio MD.          CT Angiogram Neck   Final Result       1.  Calcified plaque the left carotid bifurcation resulting in   approximately 50% stenosis of the left internal carotid artery at its   origin.   2.  No hemodynamically significant stenosis of the right carotid or   vertebral arteries.   3.  No intracranial vascular stenosis, occlusion, or aneurysm.   4.  No pathologic contrast enhancement.       This report was finalized on 4/16/2022 6:35 PM by Jesse Bucio MD.          CT Head Without Contrast Stroke Protocol   Final Result       1. No acute intracranial abnormality.       Findings personally communicated to Dannielle with stroke team at 5:58 PM   on 4/16/2022       This report was finalized on 4/16/2022 6:02 PM by Jesse Bucio MD.          MRI Brain Without Contrast    (Results Pending)     Vitals:    04/16/22 1744   BP: 155/79   BP Location: Left arm   Patient Position: Sitting   Pulse: 84   Resp: 17   Temp: 96.8 °F (36 °C)   TempSrc: Oral   SpO2: 96%   Weight: 98 kg (216 lb)   Height: 172.7 cm (68\")     Medications   sodium chloride 0.9 % flush 10 mL (has no administration in time range)   iopamidol (ISOVUE-370) 76 % injection 115 mL (115 mL Intravenous Given 4/16/22 1824)     ECG/EMG Results (last 24 hours)     Procedure Component Value Units Date/Time    ECG 12 Lead [[id number]] Collected: 04/16/22 1749     Updated: 04/16/22 1753        ECG 12 Lead   Preliminary Result         ECG 12 Lead    (Results Pending)                                                  Pt does have " an irregular rhythm.  Rate normal.  The strip I reviewed looks for like sinus rhythm with PVC.  Further evaluation indicated.    MDM    Final diagnoses:   Aphasia   Nonintractable headache, unspecified chronicity pattern, unspecified headache type   Visual disturbance   Irregular cardiac rhythm       ED Disposition  ED Disposition     ED Disposition   Decision to Admit    Condition   --    Comment   Level of Care: Telemetry [5]   Diagnosis: Aphasia [784.3.ICD-9-CM]   Admitting Physician: MICHAEL SHORT [[id number]]   Attending Physician: MICHAEL SHORT [[id number]]               No follow-up provider specified.       Medication List      No changes were made to your prescriptions during this visit.          Db Bergman DO  04/17/22 1431

## 2022-04-16 NOTE — CONSULTS
"Stroke Consult Note    Patient Name: Saulo Dahl   MRN: [id number]  Age: 72 y.o.  Sex: male  : 1950    Primary Care Physician: Han Ramirez MD  Referring Physician: Dr. Isauro Bergman    TIME STROKE TEAM CALLED:  EST     TIME PATIENT SEEN:  EST    Handedness: Right  Race: -American    Chief Complaint/Reason for Consultation: Transient aphasia    HPI: Mr. Dahl is a 72-year-old -American male with known medical diagnosis of essential hypertension, hyperlipidemia, CAD s/p CABG, diabetes mellitus type 2, migraines, and obesity who presents to the emergency department [facility] for further evaluation of transient aphasia.  The patient tells me that he was in his normal state of health and at approximately 1700 went to talk to his wife and at this time his words were \"jumbled\".  He reports that he was unable to \"connect his thoughts\" and that he was unable to understand anything he was reading.  He states he could recognize letters but could not connect them to figure out what they spelt.  He denies experiencing any unilateral weakness, unilateral numbness, balance difficulties, or facial droop however does endorse a mild headache and blurry vision.  The patient tells me he has had migraines in the past however this is not his typical presentation.  He does take ASA 81 mg and Plavix 75 mg daily and is compliant with medications.    I spoke with his wife, who is currently at the bedside, and she confirms the above story.  She states that he came out behind her and was going to tell her something and had word salad.  She states that this episode lasted approximately 10 minutes before spontaneously resolving however they decided to come to the emergency department for further evaluation.  Reports that he has never had this happen before.    The patient denies family history of stroke/TIA or blood clotting disorders.    Last Known Normal Date/Time: 170 EST     Review of Systems "   Constitutional: Negative for activity change, chills, fatigue and fever.   Eyes: Positive for visual disturbance. Negative for photophobia.   Respiratory: Negative for cough, chest tightness and shortness of breath.    Cardiovascular: Negative for chest pain and palpitations.   Gastrointestinal: Negative for blood in stool, diarrhea, nausea and vomiting.   Endocrine: Negative.    Genitourinary: Negative for dysuria and hematuria.   Musculoskeletal: Positive for neck pain.   Skin: Negative.    Neurological: Positive for speech difficulty and headaches. Negative for dizziness, seizures, syncope, facial asymmetry, weakness and numbness.   Hematological: Negative.    Psychiatric/Behavioral: Negative.       Past Medical History:   Diagnosis Date   • Coronary artery disease    • Diabetes mellitus (CMS/HCC)    • Disease of thyroid gland    • Hypercholesteremia    • Hypertension      Past Surgical History:   Procedure Laterality Date   • ABDOMINAL HERNIA REPAIR     • CARDIAC CATHETERIZATION     • CARDIAC CATHETERIZATION N/A 3/8/2019    Procedure: Left Heart Cath;  Surgeon: Jamshid Smith MD;  Location: [facility] INVASIVE LOCATION;  Service: Cardiology   • CATARACT EXTRACTION Right    • CIRCUMCISION     • CORONARY ARTERY BYPASS GRAFT     • TONSILLECTOMY     • TOTAL HIP ARTHROPLASTY Right      No family history on file.  Social History     Socioeconomic History   • Marital status:    Tobacco Use   • Smoking status: Never Smoker   • Smokeless tobacco: Never Used   Substance and Sexual Activity   • Alcohol use: No   • Drug use: No   • Sexual activity: Defer     Allergies   Allergen Reactions   • Lactose Intolerance (Gi) GI Intolerance     Prior to Admission medications    Medication Sig Start Date End Date Taking? Authorizing Provider   aspirin 81 MG EC tablet Take 81 mg by mouth Daily.    Provider, MD Kuldeep   atorvastatin (LIPITOR) 80 MG tablet Take 1 tablet by mouth Every Night. 3/8/19   Jennifer  FLACA Cobb   Cholecalciferol (VITAMIN D PO) Take 1 capsule by mouth Daily.    Kuldeep Solis MD   clopidogrel (PLAVIX) 75 MG tablet Take 1 tablet by mouth Daily. 3/9/19   Jae Rodriguez PA   doxazosin (CARDURA) 1 MG tablet Take 1 mg by mouth Every Night.    Kuldeep Solis MD   gabapentin (NEURONTIN) 300 MG capsule Take 600 mg by mouth At Night As Needed.    Kuldeep Solis MD   glimepiride (AMARYL) 2 MG tablet Take 2 mg by mouth Every Morning Before Breakfast.    Kuldeep Solis MD   ibuprofen (ADVIL,MOTRIN) 600 MG tablet Take 600 mg by mouth Every 6 (Six) Hours As Needed for Mild Pain .    Kuldeep Solis MD   levothyroxine (SYNTHROID, LEVOTHROID) 75 MCG tablet Take 75 mcg by mouth Daily.    Kuldeep Solis MD   metFORMIN (GLUCOPHAGE) 500 MG tablet Take 500 mg by mouth 2 (Two) Times a Day With Meals.    Kuldeep Solis MD   Omega-3 Fatty Acids (FISH OIL PO) Take 57 mg by mouth Daily.    Kuldeep Solis MD   omeprazole (priLOSEC) 20 MG capsule Take 20 mg by mouth Daily.    Kuldeep Solis MD   oxybutynin XL (DITROPAN-XL) 10 MG 24 hr tablet Take 10 mg by mouth Daily.    Kuldeep Solis MD   spironolactone (ALDACTONE) 50 MG tablet Take 50 mg by mouth Daily.    Kuldeep Solis MD         Temp:  [96.8 °F (36 °C)] 96.8 °F (36 °C)  Heart Rate:  [84] 84  Resp:  [17] 17  BP: (155)/(79) 155/79  Neurological Exam  Mental Status  Alert. Oriented to person, place, time and situation. Oriented to person, place, and time. Speech is normal. Language is fluent with no aphasia. Fund of knowledge is appropriate for level of education.    Cranial Nerves  CN II: Visual fields full to confrontation.  CN III, IV, VI: Extraocular movements intact bilaterally. Pupils equal round and reactive to light bilaterally.  CN V: Facial sensation is normal.  CN VII: Full and symmetric facial movement.  CN VIII: Hearing intact bilaterally.  CN IX, X: Palate elevates  symmetrically  CN XI: Shoulder shrug strength is normal.  CN XII: Tongue midline without atrophy or fasciculations.    Motor  Normal muscle bulk throughout. No fasciculations present. Normal muscle tone. Strength is 5/5 throughout all four extremities.    Sensory  Light touch is normal in upper and lower extremities.     Coordination    Finger-to-nose, rapid alternating movements and heel-to-shin normal bilaterally without dysmetria.    Gait    Steady.      Physical Exam  Vitals reviewed.   Constitutional:       General: He is not in acute distress.     Appearance: He is obese. He is not ill-appearing.   HENT:      Head: Normocephalic.      Mouth/Throat:      Mouth: Mucous membranes are moist.   Eyes:      Extraocular Movements: Extraocular movements intact.      Pupils: Pupils are equal, round, and reactive to light.   Cardiovascular:      Rate and Rhythm: Normal rate. Rhythm irregular.   Pulmonary:      Effort: Pulmonary effort is normal. No respiratory distress.      Comments: On room air  Musculoskeletal:      Cervical back: Normal range of motion and neck supple.      Right lower leg: No edema.      Left lower leg: No edema.   Skin:     General: Skin is warm and dry.   Neurological:      General: No focal deficit present.      Mental Status: He is alert and oriented to person, place, and time.      Coordination: Coordination is intact.      Deep Tendon Reflexes: Strength normal.   Psychiatric:         Mood and Affect: Mood normal.         Speech: Speech normal.         Behavior: Behavior normal.         Acute Stroke Data    Alteplase (tPA) Inclusion / Exclusion Criteria    Time: 18:14 EDT  Person Administering Scale: ALEX Tamez    Inclusion Criteria  [x]   18 years of age or greater   []   Onset of symptoms < 4.5 hours before beginning treatment (stroke onset = time patient was last seen well or without symptoms).   []   Diagnosis of acute ischemic stroke causing measurable disabling deficit  (Complete Hemianopia, Any Aphasia, Visual or Sensory Extinction, Any weakness limiting sustained effort against gravity)   [x]   Any remaining deficit considered potentially disabling in view of patient and practitioner   Exclusion criteria (Do not proceed with Alteplase if any are checked under exclusion criteria)  []   Onset unknown or GREATER than 4.5 hours   []   ICH on CT/MRI   []   CT demonstrates hypodensity representing acute or subacute infarct   []   Significant head trauma or prior stroke in the previous 3 months   []   Symptoms suggestive of subarachnoid hemorrhage   []   History of un-ruptured intracranial aneurysm GREATER than 10 mm   []   Recent intracranial or intraspinal surgery within the last 3 months   []   Arterial puncture at a non-compressible site in the previous 7 days   []   Active internal bleeding   []   Acute bleeding tendency   []   Platelet count LESS than 100,000 for known hematological diseases such as leukemia, thrombocytopenia or chronic cirrhosis   []   Current use of anticoagulant with INR GREATER than 1.7 or PT GREATER than 15 seconds, aPTT GREATER than 40 seconds   []   Heparin received within 48 hours, resulting in abnormally elevated aPTT GREATER than upper limit of normal   []   Current use of direct thrombin inhibitors or direct factor Xa inhibitors in the past 48 hours   []   Elevated blood pressure refractory to treatment (systolic GREATER than 185 mm/Hg or diastolic  GREATER than 110 mm/Hg   []   Suspected infective endocarditis and aortic arch dissection   []   Current use of therapeutic treatment dose of low-molecular-weight heparin (LMWH) within the previous 24 hours   []   Structural GI malignancy or bleed   Relative exclusion for all patients  [x]   Only minor non-disabling symptoms/complete resolution of symptoms   []   Pregnancy   []   Seizure at onset with postictal residual neurological impairments   []   Major surgery or previous trauma within past 14 days   []    History of previous spontaneous ICH, intracranial neoplasm, or AV malformation   []   Postpartum (within previous 14 days)   []   Recent GI or urinary tract hemorrhage (within previous 21 days)   []   Recent acute MI (within previous 3 months)   []   History of un-ruptured intracranial aneurysm LESS than 10 mm   []   History of ruptured intracranial aneurysm   []   Blood glucose LESS than 50 mg/dL (2.7 mmol/L)   []   Dural puncture within the last 7 days   []   Known GREATER than 10 cerebral microbleeds   Additional exclusions for patients with symptoms onset between 3 and 4.5 hours.  []   Age > 80.   []   On any anticoagulants regardless of INR  >>> Warfarin (Coumadin), Heparin, Enoxaparin (Lovenox), fondaparinux (Arixtra), bivalirudin (Angiomax), Argatroban, dabigatran (Pradaxa), rivaroxaban (Xarelto), or apixaban (Eliquis)   []   Severe stroke (NIHSS > 25).   []   History of BOTH diabetes and previous ischemic stroke.   []   The risks and benefits have been discussed with the patient or family related to the administration of IV Alteplase for stroke symptoms.   []   I have discussed and reviewed the patient's case and imaging with the attending prior to IV Alteplase.   N/A Time Alteplase administered       Hospital Meds:  Scheduled-    Infusions-     PRNs- •  sodium chloride    Functional Status Prior to Current Stroke/Buckingham Score: 0    NIH Stroke Scale  Time: 18:14 EDT  Person Administering Scale: ALEX Tamez    Interval: baseline  1a. Level of Consciousness: 0-->Alert, keenly responsive  1b. LOC Questions: 0-->Answers both questions correctly  1c. LOC Commands: 0-->Performs both tasks correctly  2. Best Gaze: 0-->Normal  3. Visual: 0-->No visual loss  4. Facial Palsy: 0-->Normal symmetrical movements  5a. Motor Arm, Left: 0-->No drift, limb holds 90 (or 45) degrees for full 10 secs  5b. Motor Arm, Right: 0-->No drift, limb holds 90 (or 45) degrees for full 10 secs  6a. Motor Leg, Left:  0-->No drift, leg holds 30 degree position for full 5 secs  6b. Motor Leg, Right: 0-->No drift, leg holds 30 degree position for full 5 secs  7. Limb Ataxia: 0-->Absent  8. Sensory: 0-->Normal, no sensory loss  9. Best Language: 0-->No aphasia, normal  10. Dysarthria: 0-->Normal  11. Extinction and Inattention (formerly Neglect): 0-->No abnormality    Total (NIH Stroke Scale): 0    Results Reviewed:  I have personally reviewed current lab, radiology, and data and agree with results.    CT of the head without contrast is negative for hemorrhaging/or acute process.    CTA head/neck reveals calcified atherosclerotic disease in the left ICA however no evidence of flow-limiting stenosis or large vessel occlusive stroke.    CT perfusion is negative for large vessel occlusive stroke      Assessment/Plan:    This is a 72-year-old -American male with known medical diagnosis of essential hypertension, hyperlipidemia, CAD s/p CABG, diabetes mellitus type 2, migraines, and obesity, on dual antiplatelet therapy at home (ASA 81 mg Plavix 75 mg daily) who presents to the emergency department tonight for further evaluation of transient aphasia.  He is not candidate for thrombolytic therapy as he has had complete resolution of symptoms.  Should the patient symptoms return he should be presented with option for IV thrombolytic therapy as this would be a disabling symptom.  He is not a candidate for endovascular therapy as there is no evidence of large vessel occlusive stroke on CTA head/neck or CT perfusion scan.      1. Transient aphasia  -Differential diagnoses include TIA/CVA versus complex migraine versus nonfocal seizure  -MRI of the brain without contrast  -P2Y12 to evaluate Plavix responsiveness  -TIA/CVA order set without thrombolytic therapy has been initiated  -A1c and lipid panel in a.m.  -Activity as tolerated  -PT/OT/SLP evaluation  -N.p.o. until nursing bedside dysphagia screen has been completed and the patient  was started on a cardiac/diabetic healthy diet  -TTE in a.m.  -Carotid ultrasound  -Continue ASA 81 mg and Plavix 75 mg daily; and is already taken dose today will resume tomorrow.  His wife indicates that he took an extra ASA 81 mg prior to coming to the emergency department.    2. Essential hypertension  -Allow permissive hypertension for adequate cerebral blood flow.  -Cardene for SBP >220  -Hold all home antihypertensives at this time  -If patient's MRI of the brain without contrast is negative for stroke can normalize patient's blood pressure, goal <130/80    3. Hyperlipidemia  -Check lipid panel  -Continue atorvastatin 80 mg nightly    4. Diabetes mellitus type 2  -Check A1c  -Maintain euglycemia, goal blood glucose <140    5.  New onset atrial fibrillation  -While discussing plan of care patient went into atrial fibrillation, flutter back into normal sinus, and then back into atrial fibrillation.  -Stat EKG  -We will need to consider starting oral anticoagulation after MRI of the brain without contrast.      Plan of care was discussed with the patient and his wife, as well as Dr. Bergman.  Patient will be admitted to the hospital service for further work-up.  Stroke neurology will continue to follow.  Please call with any questions or concerns.  Thank you for allowing us to participate in this patient's care.    Dannielle Trotter, ALEX  April 16, 2022  18:14 EDT

## 2022-04-17 ENCOUNTER — APPOINTMENT (OUTPATIENT)
Dept: CARDIOLOGY | Facility: HOSPITAL | Age: 72
End: 2022-04-17

## 2022-04-17 LAB
ALBUMIN SERPL-MCNC: 3.7 G/DL (ref 3.5–5.2)
ALBUMIN/GLOB SERPL: 1.5 G/DL
ALP SERPL-CCNC: 52 U/L (ref 39–117)
ALT SERPL W P-5'-P-CCNC: 19 U/L (ref 1–41)
ANION GAP SERPL CALCULATED.3IONS-SCNC: 11 MMOL/L (ref 5–15)
APTT PPP: 43.9 SECONDS (ref 50–95)
AST SERPL-CCNC: 26 U/L (ref 1–40)
BASOPHILS # BLD AUTO: 0.03 10*3/MM3 (ref 0–0.2)
BASOPHILS NFR BLD AUTO: 0.7 % (ref 0–1.5)
BILIRUB SERPL-MCNC: 0.3 MG/DL (ref 0–1.2)
BILIRUB UR QL STRIP: NEGATIVE
BUN SERPL-MCNC: 13 MG/DL (ref 8–23)
BUN/CREAT SERPL: 14.6 (ref 7–25)
CALCIUM SPEC-SCNC: 8.8 MG/DL (ref 8.6–10.5)
CHLORIDE SERPL-SCNC: 103 MMOL/L (ref 98–107)
CHOLEST SERPL-MCNC: 102 MG/DL (ref 0–200)
CLARITY UR: CLEAR
CO2 SERPL-SCNC: 25 MMOL/L (ref 22–29)
COLOR UR: YELLOW
CREAT SERPL-MCNC: 0.89 MG/DL (ref 0.76–1.27)
DEPRECATED RDW RBC AUTO: 38.3 FL (ref 37–54)
EGFRCR SERPLBLD CKD-EPI 2021: 90.5 ML/MIN/1.73
EOSINOPHIL # BLD AUTO: 0.27 10*3/MM3 (ref 0–0.4)
EOSINOPHIL NFR BLD AUTO: 6.1 % (ref 0.3–6.2)
ERYTHROCYTE [DISTWIDTH] IN BLOOD BY AUTOMATED COUNT: 15.4 % (ref 12.3–15.4)
ERYTHROCYTE [SEDIMENTATION RATE] IN BLOOD: 17 MM/HR (ref 0–20)
FLUAV SUBTYP SPEC NAA+PROBE: NOT DETECTED
FLUBV RNA ISLT QL NAA+PROBE: NOT DETECTED
GLOBULIN UR ELPH-MCNC: 2.5 GM/DL
GLUCOSE BLDC GLUCOMTR-MCNC: 142 MG/DL (ref 70–130)
GLUCOSE BLDC GLUCOMTR-MCNC: 159 MG/DL (ref 70–130)
GLUCOSE BLDC GLUCOMTR-MCNC: 162 MG/DL (ref 70–130)
GLUCOSE BLDC GLUCOMTR-MCNC: 176 MG/DL (ref 70–130)
GLUCOSE SERPL-MCNC: 142 MG/DL (ref 65–99)
GLUCOSE UR STRIP-MCNC: NEGATIVE MG/DL
HBA1C MFR BLD: 7.2 % (ref 4.8–5.6)
HCT VFR BLD AUTO: 30.5 % (ref 37.5–51)
HDLC SERPL-MCNC: 23 MG/DL (ref 40–60)
HGB BLD-MCNC: 10 G/DL (ref 13–17.7)
HGB UR QL STRIP.AUTO: NEGATIVE
IMM GRANULOCYTES # BLD AUTO: 0 10*3/MM3 (ref 0–0.05)
IMM GRANULOCYTES NFR BLD AUTO: 0 % (ref 0–0.5)
INR PPP: 1.08 (ref 0.84–1.13)
KETONES UR QL STRIP: NEGATIVE
LDLC SERPL CALC-MCNC: 37 MG/DL (ref 0–100)
LDLC/HDLC SERPL: 1.02 {RATIO}
LEUKOCYTE ESTERASE UR QL STRIP.AUTO: NEGATIVE
LYMPHOCYTES # BLD AUTO: 2.68 10*3/MM3 (ref 0.7–3.1)
LYMPHOCYTES NFR BLD AUTO: 60.9 % (ref 19.6–45.3)
MAGNESIUM SERPL-MCNC: 1.5 MG/DL (ref 1.6–2.4)
MCH RBC QN AUTO: 23 PG (ref 26.6–33)
MCHC RBC AUTO-ENTMCNC: 32.8 G/DL (ref 31.5–35.7)
MCV RBC AUTO: 70.1 FL (ref 79–97)
MONOCYTES # BLD AUTO: 0.29 10*3/MM3 (ref 0.1–0.9)
MONOCYTES NFR BLD AUTO: 6.6 % (ref 5–12)
NEUTROPHILS NFR BLD AUTO: 1.13 10*3/MM3 (ref 1.7–7)
NEUTROPHILS NFR BLD AUTO: 25.7 % (ref 42.7–76)
NITRITE UR QL STRIP: NEGATIVE
NRBC BLD AUTO-RTO: 0 /100 WBC (ref 0–0.2)
PH UR STRIP.AUTO: <=5 [PH] (ref 5–8)
PLATELET # BLD AUTO: 202 10*3/MM3 (ref 140–450)
PMV BLD AUTO: 10.5 FL (ref 6–12)
POTASSIUM SERPL-SCNC: 3.9 MMOL/L (ref 3.5–5.2)
PROT SERPL-MCNC: 6.2 G/DL (ref 6–8.5)
PROT UR QL STRIP: NEGATIVE
PROTHROMBIN TIME: 13.9 SECONDS (ref 11.4–14.4)
RBC # BLD AUTO: 4.35 10*6/MM3 (ref 4.14–5.8)
SARS-COV-2 RNA PNL SPEC NAA+PROBE: NOT DETECTED
SODIUM SERPL-SCNC: 139 MMOL/L (ref 136–145)
SP GR UR STRIP: 1.03 (ref 1–1.03)
TRIGL SERPL-MCNC: 278 MG/DL (ref 0–150)
TROPONIN T SERPL-MCNC: 0.05 NG/ML (ref 0–0.03)
TROPONIN T SERPL-MCNC: 0.06 NG/ML (ref 0–0.03)
TSH SERPL DL<=0.05 MIU/L-ACNC: 0.12 UIU/ML (ref 0.27–4.2)
UFH PPP CHRO-ACNC: 0.1 IU/ML (ref 0.3–0.7)
UFH PPP CHRO-ACNC: 0.1 IU/ML (ref 0.3–0.7)
UROBILINOGEN UR QL STRIP: ABNORMAL
VLDLC SERPL-MCNC: 42 MG/DL (ref 5–40)
WBC NRBC COR # BLD: 4.4 10*3/MM3 (ref 3.4–10.8)

## 2022-04-17 PROCEDURE — G0378 HOSPITAL OBSERVATION PER HR: HCPCS

## 2022-04-17 PROCEDURE — 93010 ELECTROCARDIOGRAM REPORT: CPT | Performed by: INTERNAL MEDICINE

## 2022-04-17 PROCEDURE — 84443 ASSAY THYROID STIM HORMONE: CPT | Performed by: NURSE PRACTITIONER

## 2022-04-17 PROCEDURE — 81003 URINALYSIS AUTO W/O SCOPE: CPT | Performed by: NURSE PRACTITIONER

## 2022-04-17 PROCEDURE — 93005 ELECTROCARDIOGRAM TRACING: CPT | Performed by: NURSE PRACTITIONER

## 2022-04-17 PROCEDURE — 96365 THER/PROPH/DIAG IV INF INIT: CPT

## 2022-04-17 PROCEDURE — 83735 ASSAY OF MAGNESIUM: CPT | Performed by: NURSE PRACTITIONER

## 2022-04-17 PROCEDURE — 96366 THER/PROPH/DIAG IV INF ADDON: CPT

## 2022-04-17 PROCEDURE — 97165 OT EVAL LOW COMPLEX 30 MIN: CPT

## 2022-04-17 PROCEDURE — 87636 SARSCOV2 & INF A&B AMP PRB: CPT | Performed by: INTERNAL MEDICINE

## 2022-04-17 PROCEDURE — 83036 HEMOGLOBIN GLYCOSYLATED A1C: CPT

## 2022-04-17 PROCEDURE — 85610 PROTHROMBIN TIME: CPT | Performed by: INTERNAL MEDICINE

## 2022-04-17 PROCEDURE — 93306 TTE W/DOPPLER COMPLETE: CPT

## 2022-04-17 PROCEDURE — 99225 PR SBSQ OBSERVATION CARE/DAY 25 MINUTES: CPT | Performed by: INTERNAL MEDICINE

## 2022-04-17 PROCEDURE — 82962 GLUCOSE BLOOD TEST: CPT

## 2022-04-17 PROCEDURE — 85520 HEPARIN ASSAY: CPT

## 2022-04-17 PROCEDURE — 99214 OFFICE O/P EST MOD 30 MIN: CPT | Performed by: PSYCHIATRY & NEUROLOGY

## 2022-04-17 PROCEDURE — 25010000002 HEPARIN (PORCINE) PER 1000 UNITS: Performed by: INTERNAL MEDICINE

## 2022-04-17 PROCEDURE — 97161 PT EVAL LOW COMPLEX 20 MIN: CPT

## 2022-04-17 PROCEDURE — 80061 LIPID PANEL: CPT

## 2022-04-17 PROCEDURE — 97116 GAIT TRAINING THERAPY: CPT

## 2022-04-17 PROCEDURE — 84484 ASSAY OF TROPONIN QUANT: CPT | Performed by: NURSE PRACTITIONER

## 2022-04-17 PROCEDURE — 85025 COMPLETE CBC W/AUTO DIFF WBC: CPT | Performed by: NURSE PRACTITIONER

## 2022-04-17 PROCEDURE — 85730 THROMBOPLASTIN TIME PARTIAL: CPT | Performed by: INTERNAL MEDICINE

## 2022-04-17 PROCEDURE — 80053 COMPREHEN METABOLIC PANEL: CPT | Performed by: NURSE PRACTITIONER

## 2022-04-17 PROCEDURE — 93880 EXTRACRANIAL BILAT STUDY: CPT

## 2022-04-17 PROCEDURE — 63710000001 INSULIN LISPRO (HUMAN) PER 5 UNITS: Performed by: NURSE PRACTITIONER

## 2022-04-17 PROCEDURE — 97535 SELF CARE MNGMENT TRAINING: CPT

## 2022-04-17 PROCEDURE — 99203 OFFICE O/P NEW LOW 30 MIN: CPT | Performed by: PHYSICIAN ASSISTANT

## 2022-04-17 RX ORDER — HEPARIN SOD,PORCINE/0.9 % NACL 25000/250
14 INTRAVENOUS SOLUTION INTRAVENOUS
Status: DISCONTINUED | OUTPATIENT
Start: 2022-04-17 | End: 2022-04-18

## 2022-04-17 RX ORDER — CLOPIDOGREL BISULFATE 75 MG/1
75 TABLET ORAL DAILY
Status: DISCONTINUED | OUTPATIENT
Start: 2022-04-17 | End: 2022-04-18 | Stop reason: HOSPADM

## 2022-04-17 RX ORDER — HEPARIN SODIUM 1000 [USP'U]/ML
4000 INJECTION, SOLUTION INTRAVENOUS; SUBCUTANEOUS ONCE
Status: DISCONTINUED | OUTPATIENT
Start: 2022-04-17 | End: 2022-04-17 | Stop reason: ALTCHOICE

## 2022-04-17 RX ORDER — SPIRONOLACTONE 50 MG/1
50 TABLET, FILM COATED ORAL DAILY
Status: DISCONTINUED | OUTPATIENT
Start: 2022-04-17 | End: 2022-04-18 | Stop reason: HOSPADM

## 2022-04-17 RX ORDER — HEPARIN SODIUM 1000 [USP'U]/ML
60 INJECTION, SOLUTION INTRAVENOUS; SUBCUTANEOUS AS NEEDED
Status: DISCONTINUED | OUTPATIENT
Start: 2022-04-17 | End: 2022-04-17 | Stop reason: ALTCHOICE

## 2022-04-17 RX ORDER — HEPARIN SODIUM 1000 [USP'U]/ML
30 INJECTION, SOLUTION INTRAVENOUS; SUBCUTANEOUS AS NEEDED
Status: DISCONTINUED | OUTPATIENT
Start: 2022-04-17 | End: 2022-04-17 | Stop reason: ALTCHOICE

## 2022-04-17 RX ADMIN — ATORVASTATIN CALCIUM 80 MG: 40 TABLET, FILM COATED ORAL at 22:01

## 2022-04-17 RX ADMIN — PANTOPRAZOLE SODIUM 40 MG: 40 TABLET, DELAYED RELEASE ORAL at 06:04

## 2022-04-17 RX ADMIN — ASPIRIN 81 MG 81 MG: 81 TABLET ORAL at 08:06

## 2022-04-17 RX ADMIN — CLOPIDOGREL BISULFATE 75 MG: 75 TABLET ORAL at 15:33

## 2022-04-17 RX ADMIN — OXYBUTYNIN CHLORIDE 10 MG: 10 TABLET, EXTENDED RELEASE ORAL at 08:06

## 2022-04-17 RX ADMIN — Medication 10 ML: at 22:01

## 2022-04-17 RX ADMIN — TERAZOSIN HYDROCHLORIDE 1 MG: 1 CAPSULE ORAL at 00:25

## 2022-04-17 RX ADMIN — LEVOTHYROXINE SODIUM 75 MCG: 0.07 TABLET ORAL at 06:04

## 2022-04-17 RX ADMIN — HEPARIN SODIUM 10.2 UNITS/KG/HR: 5000 INJECTION INTRAVENOUS; SUBCUTANEOUS at 02:34

## 2022-04-17 RX ADMIN — TERAZOSIN HYDROCHLORIDE 1 MG: 1 CAPSULE ORAL at 22:14

## 2022-04-17 RX ADMIN — SPIRONOLACTONE 50 MG: 50 TABLET ORAL at 15:33

## 2022-04-17 RX ADMIN — INSULIN LISPRO 2 UNITS: 100 INJECTION, SOLUTION INTRAVENOUS; SUBCUTANEOUS at 22:00

## 2022-04-17 RX ADMIN — INSULIN LISPRO 2 UNITS: 100 INJECTION, SOLUTION INTRAVENOUS; SUBCUTANEOUS at 08:06

## 2022-04-17 NOTE — PROGRESS NOTES
"Neurology       Patient Care Team:  Han Ramirez MD as PCP - General  Han Ramirez MD as PCP - Family Medicine    Chief complaint TIA    Subjective .     History:    Patient is seen today with his wife at bedside.  They tell me that he is back to his baseline.  They confirm his history of having a brief episode of garbled speech yesterday.      ROS: Denies pain    Objective     Vital Signs   Blood pressure 132/76, pulse 68, temperature 97.6 °F (36.4 °C), temperature source Oral, resp. rate 18, height 170 cm (66.93\"), weight 98 kg (216 lb), SpO2 93 %.    Physical Exam:  -American man, awake and alert.  He is able to introduce me to his wife, oriented to self time and place.  Speech is clear with no aphasia or dysarthria.  EOMI, visual fields intact to threat, no facial weakness.  Moving all extremities equally, he does have some pain with right hip movement that is chronic but otherwise is full strength.  No ataxia on gross observation.  On room air, even respirations.  Skin is warm dry intact.    Results Review:  Brain MRI personally viewed, there is no evidence of acute stroke  2D echo and carotid duplex are pending results  P2Y12 290  Assessment/Plan     72-year-old -American man who has a history of hypertension, hyperlipidemia, coronary artery disease status post CABG, diabetes, migraines, who is on dual antiplatelet therapy at home.  Patient had an episode of transient aphasia concerning for TIA.    Patient does not a Plavix responder.  We will discontinue this medication.    Patient has been noted to be in atrial fibrillation on telemetry.  Cardiology has been consulted.    If they agree, I would recommend starting Eliquis 5 mg twice daily.    2D echo and carotid ultrasound results are pending.    1.  TIA  -MRI negative for stroke  -Not a Plavix responder, discontinue Plavix  -Noted to be in A. fib, start Eliquis if cardiology agrees  -Carotid ultrasound and 2D echo " pending  -PT/OT    2.  Essential hypertension  -Normotensive goal, can restart home meds    3. Diabetes  -A1c 8.4, please consult diabetes management    4.  Hyperlipidemia  -LDL pending  -High-dose atorvastatin    5.  New onset A. fib  -MRI brain is negative for acute stroke, believe he had a TIA  -Okay from my perspective to transition to [facility] if cardiology agrees    I discussed the patients findings and my recommendations with patient, patient's wife, primary team    Reshma Quezada MD  04/17/22  13:16 EDT    Update 4pm  Discussed with Dr Deluca. No evidence of atrial fib noted on telemetry since admission. With that information, will not start DOAC but instead change from plavix to brillinta and request loop recorder prior to d/c.

## 2022-04-17 NOTE — PLAN OF CARE
Goal Outcome Evaluation:            VSS. Alert and oriented. No complaints of pain. Slept well between nursing care. Questions and concerns answered.

## 2022-04-17 NOTE — PLAN OF CARE
Goal Outcome Evaluation:  Plan of Care Reviewed With: patient, spouse        Progress: improving (OT IE)  Outcome Evaluation: OT eval completed. Pt presents with mild decrease in ADL and related t/f, mobility I compared to PLOF. Pt limited by mild impairments in fxl endurance, balance, safety awareness during more dynamic tasks. Pt completed supine > sitting with CGA, scooting to EOB with MI, STS at EOB w/ SBA, spv at toilet t/f and CGA during fxl mobility w/ cues for safety as pt demo'd mild impulsivity and increased speed. Pt completed UBD w/ min A, d/d socks with spv to SBA w/ increased A at RLE and SUA for hand/face washing. Pt largely at baseline function yet could benefit from IPOT POC to address impairments impacting function and return home w/ A when medically ready.

## 2022-04-17 NOTE — CONSULTS
[facility] Cardiology         Date of Hospital Visit: 22      Place of Service: [facility]    Patient Name: Saulo Dahl  :1950    Referral Provider: Hospitalist  Primary Care Provider: Han Ramirez MD      Chief complaint/Reason for Consultation:  Coronary Artery Disease and elevated troponin         Problem List:  Active Hospital Problems    Diagnosis    • **Expressive aphasia    • CAD (coronary artery disease)      · Remote 3V CABG () and stent x1 (~6 yrs ago)   · [facility], 3/8/2019: Drug-eluting stent x2 distal and proximal body of saphenous vein graft to circumflex coronary artery.  PTCA distal LAD through LIMA graft.     • Elevated troponin    • HTN (hypertension)    • HLD (hyperlipidemia)    • T2DM (type 2 diabetes mellitus) (HCC)    • GERD without esophagitis    • Hypothyroidism (acquired)    • Hx of migraines          History of Present Illness:  This is a 72-year old hypertensive, dyslipidemic, diabetic male with known coronary artery disease.  He presented to the [facility] emergency department yesterday with expressive aphasia.  Cardiology has been consulted due to elevated troponin in the absence of grabiel anginal symptoms.  His aphasia has resolved and has been attributed to atypical migraine.  He denies any current or recent exertional chest pain or dyspnea, denies orthopnea, PND, claudication, lower extremity edema.  He does not check his blood pressure regularly at home.  He states compliance with current medical regimen.  He has had no awareness of tachypalpitations, denies dizziness or syncope.  Preliminary echocardiogram shows marginally normal ejection fraction.  Carotid duplex study shows minimal, nonobstructive disease bilaterally.          Past Surgical History:   Procedure Laterality Date   • ABDOMINAL HERNIA REPAIR     • CARDIAC CATHETERIZATION     • CARDIAC CATHETERIZATION N/A 3/8/2019    Procedure: Left Heart Cath;  Surgeon: Jamshid Smith  MD [place];  Location: [facility] CATH INVASIVE LOCATION;  Service: Cardiology   • CATARACT EXTRACTION Right    • CIRCUMCISION     • CORONARY ARTERY BYPASS GRAFT     • TONSILLECTOMY     • TOTAL HIP ARTHROPLASTY Right        Allergies   Allergen Reactions   • Lactose Intolerance (Gi) GI Intolerance       Current Outpatient Medications   Medication Instructions   • aspirin 81 mg, Oral, Daily   • atorvastatin (LIPITOR) 80 mg, Oral, Nightly   • Cholecalciferol (VITAMIN D PO) 1 capsule, Oral, Daily   • clopidogrel (PLAVIX) 75 mg, Oral, Daily   • doxazosin (CARDURA) 1 mg, Oral, Nightly   • gabapentin (NEURONTIN) 600 mg, Oral, Nightly PRN   • glimepiride (AMARYL) 2 mg, Oral, Every Morning Before Breakfast   • ibuprofen (ADVIL,MOTRIN) 600 mg, Oral, Every 6 Hours PRN   • levothyroxine (SYNTHROID, LEVOTHROID) 75 mcg, Oral, Daily   • metFORMIN (GLUCOPHAGE) 500 mg, Oral, 2 Times Daily With Meals   • Omega-3 Fatty Acids (FISH OIL PO) 57 mg, Oral, Daily   • omeprazole (PRILOSEC) 20 mg, Oral, Daily   • oxybutynin XL (DITROPAN-XL) 10 mg, Oral, Daily   • spironolactone (ALDACTONE) 50 mg, Oral, Daily          Scheduled Meds:  aspirin, 81 mg, Oral, Daily   Or  aspirin, 300 mg, Rectal, Daily  atorvastatin, 80 mg, Oral, Nightly  insulin lispro, 0-7 Units, Subcutaneous, 4x Daily With Meals & Nightly  levothyroxine, 75 mcg, Oral, Q AM  oxybutynin XL, 10 mg, Oral, Daily  pantoprazole, 40 mg, Oral, QAM  sodium chloride, 10 mL, Intravenous, Q12H  sodium chloride, 10 mL, Intravenous, Q12H  terazosin, 1 mg, Oral, Nightly      Continuous Infusions:heparin, 10.2 Units/kg/hr, Last Rate: 10.2 Units/kg/hr (04/17/22 023)  Pharmacy to Dose Heparin,             Social History     Socioeconomic History   • Marital status:    Tobacco Use   • Smoking status: Never Smoker   • Smokeless tobacco: Never Used   Substance and Sexual Activity   • Alcohol use: Never   • Drug use: Never   • Sexual activity: Defer       Family History   Problem Relation Age of  "Onset   • Hypertension Mother    • Heart disease Mother    • Diabetes Father    • Hypertension Father    • Heart disease Father        REVIEW OF SYSTEMS:   Review of Systems   Constitutional: Negative.   HENT: Negative.    Eyes: Negative.    Cardiovascular: Negative.    Respiratory: Negative.    Endocrine: Negative.    Hematologic/Lymphatic: Negative.    Skin: Negative.    Musculoskeletal: Negative.    Gastrointestinal: Negative.    Genitourinary: Negative.    Neurological: Negative.    Psychiatric/Behavioral: Negative.    Allergic/Immunologic: Negative.    All other systems reviewed and are negative.           Objective:  Vitals:    04/17/22 0025 04/17/22 0452 04/17/22 1200 04/17/22 1207   BP: 152/99 132/76     BP Location:  Right arm     Patient Position:  Lying     Pulse: 58 68     Resp:  18     Temp:  97.6 °F (36.4 °C)     TempSrc:  Oral     SpO2:  93%     Weight:   98 kg (216 lb) 98 kg (216 lb)   Height:   170 cm (66.93\") 170 cm (66.93\")     Body mass index is 33.9 kg/m².  Flowsheet Rows    Flowsheet Row First Filed Value   Admission Height 172.7 cm (68\") Documented at 04/16/2022 1744   Admission Weight 98 kg (216 lb) Documented at 04/16/2022 1744          Intake/Output Summary (Last 24 hours) at 4/17/2022 1322  Last data filed at 4/17/2022 0234  Gross per 24 hour   Intake --   Output 300 ml   Net -300 ml       Vitals reviewed.   Constitutional:       Appearance: Healthy appearance. Not in distress.   Eyes:      Conjunctiva/sclera: Conjunctivae normal.      Pupils: Pupils are equal, round, and reactive to light.   Neck:      Vascular: JVD normal.   Pulmonary:      Effort: Pulmonary effort is normal.      Breath sounds: Normal breath sounds.   Chest:      Chest wall: Not tender to palpatation.   Cardiovascular:      Normal rate. Regular rhythm.      Murmurs: There is no murmur.      No gallop. No click. No rub.   Pulses:     Intact distal pulses.   Edema:     Peripheral edema absent.   Abdominal:      General: " Bowel sounds are normal.      Palpations: Abdomen is soft.   Musculoskeletal: Normal range of motion.         General: No deformity.      Cervical back: Normal range of motion. Skin:     General: Skin is warm and dry.   Neurological:      General: No focal deficit present.      Mental Status: Alert.                 Lab Review:                CBC:      Lab 04/16/22 1817 04/16/22 1812   WBC  --  5.91   HEMOGLOBIN  --  10.7*   HEMOGLOBIN, POC 12.9  --    HEMATOCRIT  --  33.9*   HEMATOCRIT POC 38  --    PLATELETS  --  190   NEUTROS ABS  --  1.68*   IMMATURE GRANS (ABS)  --  0.02   LYMPHS ABS  --  3.46*   MONOS ABS  --  0.41   EOS ABS  --  0.30   MCV  --  72.7*     CMP:        Lab 04/16/22 1817 04/16/22 1812 04/16/22 1810   CREATININE 1.20  --  1.10   EGFR 63.9  --   --    ALT (SGPT)  --  23  --    AST (SGOT)  --  37  --          Lab Results   Component Value Date    HGBA1C 8.4 (H) 04/16/2015     Estimated Creatinine Clearance: 61 mL/min (by C-G formula based on SCr of 1.2 mg/dL).  No results found for: DDIMER        CARDIAC LABS:      Lab 04/16/22 2324 04/16/22 2120 04/16/22 1812   TROPONIN T 0.057* 0.044* 0.039*       No results found for: CHOL, CHLPL, TRIG, HDL, LDL, LDLDIRECT        EKG:         Result Review:  I have personally reviewed the results from the time of admission and agree with these findings:  [x]  Laboratory  [x]  Radiology  [x]  EKG/Telemetry   []  Pathology  [x]  Old records  []  Other:        Assessment and Plan:     1.  Elevated troponin   -No current anginal symptoms   -Likely type II mechanism   -N.p.o. after midnight for possible cardiac catheterization versus cardiac PET scan    2.  CAD   -No recent anginal symptoms   -Resume Plavix   -Resume risk factor modification, FLP in morning    3.  Hypertension   -Restart home antihypertensives   -Not a good candidate for beta-blockade due to baseline low heart rate    4.  Acute migraine headache   -Per hospitalist and neurology    5.  Sinus  arrhythmia, rare PACs and PVCs.   -I have reviewed 100% of his telemetry since arrival on the telemetry unit on 4/16/2022 at 9:17 PM to current and find no evidence of atrial fibrillation.  What I do see is sinus arrhythmia, PACs and PVCs.   -If atrial fibrillation was indeed viewed in the emergency department it has not been captured for review.  If clinically suspected I would suggest discharge to home with a long-term monitor      Dr. Smith to resume care in the morning       Electronically signed by FLACA Matias, 04/17/22, 1:39 PM EDT.

## 2022-04-17 NOTE — PLAN OF CARE
Problem: Adult Inpatient Plan of Care  Goal: Plan of Care Review  Flowsheets (Taken 4/17/2022 7520)  Progress: improving  Plan of Care Reviewed With:   patient   spouse  Outcome Evaluation: PT eval complete. Pt ambulated 360 feet using no AD and supervision. Pt ascended/descended 2 steps using no AD and supervision. Gait/stair training limited by fatigue. No dizziness/LH noted. STS performed with supervision. Slight increase in BP to 180/93 following PT, RN notified. Functionally, pt safe to d/c home with assist today from a PT perspective.   Goal Outcome Evaluation:  Plan of Care Reviewed With: patient, spouse        Progress: improving  Outcome Evaluation: PT eval complete. Pt ambulated 360 feet using no AD and supervision. Pt ascended/descended 2 steps using no AD and supervision. Gait/stair training limited by fatigue. No dizziness/LH noted. STS performed with supervision. Slight increase in BP to 180/93 following PT, RN notified. Functionally, pt safe to d/c home with assist today from a PT perspective.

## 2022-04-17 NOTE — PROGRESS NOTES
[facility] Medicine Services  PROGRESS NOTE    Patient Name: Saulo Dahl  : 1950  MRN: [id number]    Date of Admission: 2022  Primary Care Physician: Han Ramirez MD    Subjective   Subjective     CC: f/u aphasia    HPI: Up walking around room w/ wife in room. Says he is totally back to normal. Says this episode was triggered by lack of sleep which he says is a usual trigger for his migraines.    ROS:  Gen- No fevers, chills  CV- No chest pain, palpitations  Resp- No cough, dyspnea  GI- No N/V/D, abd pain     Objective   Objective     Vital Signs:   Temp:  [96.8 °F (36 °C)-97.7 °F (36.5 °C)] 97.6 °F (36.4 °C)  Heart Rate:  [57-84] 68  Resp:  [17-18] 18  BP: (132-189)/(76-99) 132/76     Physical Exam:  Constitutional: No acute distress, awake, alert  HENT: NCAT, mucous membranes moist  Respiratory: Clear to auscultation bilaterally, respiratory effort normal   Cardiovascular: RRR, no murmurs, rubs, or gallops  Gastrointestinal: Positive bowel sounds, soft, nontender, nondistended  Musculoskeletal: No bilateral ankle edema  Psychiatric: Appropriate affect, cooperative  Neurologic: Oriented x 3, strength symmetric in all extremities, Cranial Nerves grossly intact to confrontation, speech clear  Skin: No rashes    Results Reviewed:  LAB RESULTS:      Lab 22   WBC  --   --  5.91   HEMOGLOBIN  --   --  10.7*   HEMOGLOBIN, POC  --  12.9  --    HEMATOCRIT  --   --  33.9*   HEMATOCRIT POC  --  38  --    PLATELETS  --   --  190   NEUTROS ABS  --   --  1.68*   IMMATURE GRANS (ABS)  --   --  0.02   LYMPHS ABS  --   --  3.46*   MONOS ABS  --   --  0.41   EOS ABS  --   --  0.30   MCV  --   --  72.7*   SED RATE 17  --   --    APTT  --   --  30.4         Lab 22   CREATININE 1.20 1.10   EGFR 63.9  --          Lab 22   ALT (SGPT) 23   AST (SGOT) 37         Lab 22  2324 22    TROPONIN T 0.057* 0.044* 0.039*                 Brief Urine Lab Results  (Last result in the past 365 days)      Color   Clarity   Blood   Leuk Est   Nitrite   Protein   CREAT   Urine HCG        04/17/22 0057 Yellow   Clear   Negative   Negative   Negative   Negative                 Microbiology Results Abnormal     Procedure Component Value - Date/Time    COVID PRE-OP / PRE-PROCEDURE SCREENING ORDER (NO ISOLATION) - Swab, Nasopharynx [[id number]]  (Normal) Collected: 04/17/22 0045    Lab Status: Final result Specimen: Swab from Nasopharynx Updated: 04/17/22 0128    Narrative:      The following orders were created for panel order COVID PRE-OP / PRE-PROCEDURE SCREENING ORDER (NO ISOLATION) - Swab, Nasopharynx.  Procedure                               Abnormality         Status                     ---------                               -----------         ------                     COVID-19 and FLU A/B PCR...[[id number]]  Normal              Final result                 Please view results for these tests on the individual orders.    COVID-19 and FLU A/B PCR - Swab, Nasopharynx [[id number]]  (Normal) Collected: 04/17/22 0045    Lab Status: Final result Specimen: Swab from Nasopharynx Updated: 04/17/22 0128     COVID19 Not Detected     Influenza A PCR Not Detected     Influenza B PCR Not Detected    Narrative:      Fact sheet for providers: https://www.fda.gov/media/973488/download    Fact sheet for patients: https://www.fda.gov/media/603840/download    Test performed by PCR.          CT Angiogram Neck    Result Date: 4/16/2022  DATE OF EXAM: 4/16/2022 6:04 PM  PROCEDURE: CT ANGIOGRAM NECK-, CT ANGIOGRAM HEAD W AI ANALYSIS OF LVO-  INDICATIONS: Stroke, follow up  COMPARISON: CT head and CT perfusion 4/16/2022  TECHNIQUE: CTA of the head and CTA of the neck was performed after the intravenous administration of Isovue 370. Reconstructed coronal and sagittal images were also obtained. In addition, a 3-D volume rendered  image was obtained after post processing. Automated exposure control and iterative reconstruction methods were used. AI analysis of LVO was utilized for the CTA Head imaging portion of the study.  The radiation dose reduction device was turned on for each scan per the ALARA (As Low as Reasonably Achievable) protocol.  Stenosis measurement was performed by the NASCET or similar method.  FINDINGS: CTA neck: Aortic arch appears within normal limits.  Origins of the great vessels are patent.  Left vertebral artery takes origin directly from the arch just proximal to the origin of the left subclavian. Portions of the brachycephalic artery right common carotid artery are obscured by beam hardening artifact from dense contrast within the right subclavian vein and superior vena cava.  Visualized portions of the right common carotid and subclavian arteries appear patent.  There is no significant plaque the right carotid bifurcation.  Right internal and external carotid arteries are patent without significant stenosis.  Left common carotid artery is patent without focal stenosis.  There is mild calcified plaque at the left carotid bifurcation resulting in less than 50% stenosis.  Remaining portions of the left internal carotid artery are patent without focal stenosis.  Visualized left subclavian artery appears patent.  Vertebral arteries are codominant and patent to the level of the basilar without evidence of stenosis.  Unenhanced soft tissues of the neck appear within normal limits. Visualized lung apices  CTA HEAD:  Intracranial bilateral internal carotid arteries appear patent without focal stenosis.  Bilateral anterior cerebral and middle cerebral arteries appear patent without focal stenosis.  No anterior circulation aneurysm is seen.  Basilar artery is patent without focal stenosis.  Bilateral posterior cerebral arteries appear patent without focal stenosis.  Superior cerebellar arteries appear patent.  No posterior  circulation aneurysm identified.  There is no pathologic intracranial contrast enhancement.  Globes and orbits appear within normal          Impression:  1.  Calcified plaque the left carotid bifurcation resulting in approximately 50% stenosis of the left internal carotid artery at its origin. 2.  No hemodynamically significant stenosis of the right carotid or vertebral arteries. 3.  No intracranial vascular stenosis, occlusion, or aneurysm. 4.  No pathologic contrast enhancement.  This report was finalized on 4/16/2022 6:35 PM by Jesse Bucio MD.      MRI Brain Without Contrast    Result Date: 4/16/2022  EXAM: MRI BRAIN WO CONTRAST EXAM DATE: 4/16/2022 8:10 PM INDICATION: Transient aphasia. COMPARISON: 4/16/2022 . TECHNIQUE: Multisequence, multiangle images were obtained through the brain without the administration of intravenous contrast. CONTRAST: None. FINDINGS: TECHNICAL: Some sequences motion degraded. INTRACRANIAL CONTENTS: No intracranial mass. Empty sella. No intracranial hemorrhage. No acute infarct. Ventricles, sulci, and cisterns prominent due to mild volume loss. SOFT TISSUES: No significant abnormality.  BONES / BONE MARROW: No suspicious bone marrow signal abnormality. ORBITS: Within normal limits. MASTOID AIR CELLS: No significant opacification. MAJOR INTRACRANIAL FLOW VOIDS: Preserved.     Impression: No acute intracranial abnormality identified. Empty sella, typically an incidental finding of no clinical significance. Less commonly, it can be associated with endocrine abnormalities and increased intracranial pressure. Electronically signed by:  Joshua Britt  4/16/2022 8:22 PM Mountain Time    XR Chest 1 View    Result Date: 4/16/2022  XR CHEST 1 VW-  Date of Exam: 4/16/2022 5:57 PM  Indication: Acute Stroke Protocol (onset < 12 hrs).  Comparison:?None available.  Technique:?A single view of the chest was obtained.  FINDINGS:  ?Patient status post median sternotomy.  Heart size and pulmonary  vessels are within normal limits.  Lungs are clear bilaterally.  No pleural effusion or pneumothorax.        Impression:  1. No acute cardiopulmonary disease.   This report was finalized on 4/16/2022 6:41 PM by Jesse Bucio MD.      CT Head Without Contrast Stroke Protocol    Result Date: 4/16/2022  CT HEAD WO CONTRAST STROKE PROTOCOL-  Date of Exam: 4/16/2022 5:56 PM  Indication: Neuro deficit, acute, stroke suspected.  Comparison Exams: 4/16/2015  Technique: Multiple axial images were obtained from the skull base to the vertex without the administration of IV contrast. The axial data was used to generate reformatted images in the coronal and sagittal planes. Automated exposure control and iterative reconstruction methods were used.  FINDINGS: There is no acute infarct or hemorrhage.   No abnormal extra-axial fluid collections are seen.   There is no mass effect or hydrocephalus.  There is no evidence of skull fracture.   Visualized paranasal sinuses and mastoid air cells are clear.  The globes and orbits are within normal limits.      Impression:  1. No acute intracranial abnormality.  Findings personally communicated to Dannielle with stroke team at 5:58 PM on 4/16/2022  This report was finalized on 4/16/2022 6:02 PM by Jesse Bucio MD.      CT Angiogram Head w AI Analysis of LVO    Result Date: 4/16/2022  DATE OF EXAM: 4/16/2022 6:04 PM  PROCEDURE: CT ANGIOGRAM NECK-, CT ANGIOGRAM HEAD W AI ANALYSIS OF LVO-  INDICATIONS: Stroke, follow up  COMPARISON: CT head and CT perfusion 4/16/2022  TECHNIQUE: CTA of the head and CTA of the neck was performed after the intravenous administration of Isovue 370. Reconstructed coronal and sagittal images were also obtained. In addition, a 3-D volume rendered image was obtained after post processing. Automated exposure control and iterative reconstruction methods were used. AI analysis of LVO was utilized for the CTA Head imaging portion of the study.  The radiation dose  reduction device was turned on for each scan per the ALARA (As Low as Reasonably Achievable) protocol.  Stenosis measurement was performed by the NASCET or similar method.  FINDINGS: CTA neck: Aortic arch appears within normal limits.  Origins of the great vessels are patent.  Left vertebral artery takes origin directly from the arch just proximal to the origin of the left subclavian. Portions of the brachycephalic artery right common carotid artery are obscured by beam hardening artifact from dense contrast within the right subclavian vein and superior vena cava.  Visualized portions of the right common carotid and subclavian arteries appear patent.  There is no significant plaque the right carotid bifurcation.  Right internal and external carotid arteries are patent without significant stenosis.  Left common carotid artery is patent without focal stenosis.  There is mild calcified plaque at the left carotid bifurcation resulting in less than 50% stenosis.  Remaining portions of the left internal carotid artery are patent without focal stenosis.  Visualized left subclavian artery appears patent.  Vertebral arteries are codominant and patent to the level of the basilar without evidence of stenosis.  Unenhanced soft tissues of the neck appear within normal limits. Visualized lung apices  CTA HEAD:  Intracranial bilateral internal carotid arteries appear patent without focal stenosis.  Bilateral anterior cerebral and middle cerebral arteries appear patent without focal stenosis.  No anterior circulation aneurysm is seen.  Basilar artery is patent without focal stenosis.  Bilateral posterior cerebral arteries appear patent without focal stenosis.  Superior cerebellar arteries appear patent.  No posterior circulation aneurysm identified.  There is no pathologic intracranial contrast enhancement.  Globes and orbits appear within normal          Impression:  1.  Calcified plaque the left carotid bifurcation resulting in  approximately 50% stenosis of the left internal carotid artery at its origin. 2.  No hemodynamically significant stenosis of the right carotid or vertebral arteries. 3.  No intracranial vascular stenosis, occlusion, or aneurysm. 4.  No pathologic contrast enhancement.  This report was finalized on 4/16/2022 6:35 PM by Jesse Bucio MD.      CT CEREBRAL PERFUSION WITH & WITHOUT CONTRAST    Result Date: 4/16/2022  CT CEREBRAL PERFUSION W WO CONTRAST-  Date of Exam: 4/16/2022 6:04 PM  Indication: Neuro deficit, acute, stroke suspected.  Comparison Exams: CT head without contrast 4/6/2022  TECHNIQUE: Perfusion imaging was performed of the brain after the administration of IV contrast.  Low-dose CT acquisition technique included one of the following options: 1. Automated exposure control, 2. Adjustment of mA and/or KV according to patient's size and/or 3. Use of iterative reconstruction.     CT PERFUSION BRAIN:  Cerebral blood flow, blood volume and mean transit time maps are symmetric without large perfusion defect.  CBF<30% volume: 0 mL Tmax>6sec volume: 0 mL Mismatch volume: 0 mL Mismatch ratio: None          Impression:   1.  No perfusion abnormality seen to suggest ischemia or core infarct.  This report was finalized on 4/16/2022 6:25 PM by Jesse Bucio MD.            I have reviewed the medications:  Scheduled Meds:aspirin, 81 mg, Oral, Daily   Or  aspirin, 300 mg, Rectal, Daily  atorvastatin, 80 mg, Oral, Nightly  insulin lispro, 0-7 Units, Subcutaneous, 4x Daily With Meals & Nightly  levothyroxine, 75 mcg, Oral, Q AM  oxybutynin XL, 10 mg, Oral, Daily  pantoprazole, 40 mg, Oral, QAM  sodium chloride, 10 mL, Intravenous, Q12H  sodium chloride, 10 mL, Intravenous, Q12H  terazosin, 1 mg, Oral, Nightly      Continuous Infusions:heparin, 10.2 Units/kg/hr, Last Rate: 10.2 Units/kg/hr (04/17/22 0234)  Pharmacy to Dose Heparin,       PRN Meds:.•  acetaminophen  •  dextrose  •  dextrose  •  glucagon (human  recombinant)  •  nitroglycerin  •  Pharmacy to Dose Heparin  •  sodium chloride  •  sodium chloride  •  sodium chloride    Assessment/Plan   Assessment & Plan     Active Hospital Problems    Diagnosis  POA   • **Expressive aphasia [R47.01]  Yes   • CAD (coronary artery disease) [I25.10]  Yes   • HTN (hypertension) [I10]  Yes   • HLD (hyperlipidemia) [E78.5]  Yes   • GERD without esophagitis [K21.9]  Yes   • T2DM (type 2 diabetes mellitus) (HCC) [E11.9]  Yes   • Hypothyroidism (acquired) [E03.9]  Yes   • Hx of migraines [Z86.69]  Not Applicable   • Elevated troponin [R77.8]  Yes      Resolved Hospital Problems   No resolved problems to display.        Brief Hospital Course to date:  Saulo Dahl is a 72 y.o. male male w/ a hx of CAD (s/p CABG, stent x1), HTN, HLD, T2DM, hypothyroidism, GERD, migraines who presented to the ED w/ c/o slurred speech.      **Expressive aphasia; r/o CVA   **Hx of complex migraines   -Stroke neurology following. Continue ASA, statin, plavix for now though p2y12 not suppressed indicating non-effectiveness.  -Carotid duplex, echo pending.     **Elevated troponin   **Concern for new atrial fibrillation  -His elevated troponin is likely due to above vs a fib and not indicative of ACS. He denies any chest pain. Continue medical management as above.  -Stroke neurology noted on telemetry in ED that patient appeared to exhibit atrial fibrillation, EKG performed w/ sinus arrhythmia. Now on IV heparin.  -Cardiology consult pending.     **HTN emergency?  **HLD  **CAD (3V CABG 2011, s/p stent x1 ~ 6 yrs ago)   -CP free. Continue statin, plavix   -ECHO pending     **T2DM  -FSBG q ac/hs w/ LDSS      **GERD  -continue Prilosec (sunbProtonix)      **Hypothyroidism   -tsh pending   -continue synthroid     This patient's problems and plans were partially entered by my partner and updated as appropriate by me 04/17/22.    DVT prophylaxis:  Medical and mechanical DVT prophylaxis orders are present.        AM-PAC 6 Clicks Score (PT): 22 (04/17/22 0920)    Disposition: I expect the patient to be discharged TBD.    CODE STATUS:   Code Status and Medical Interventions:   Ordered at: 04/16/22 2059     Code Status (Patient has no pulse and is not breathing):    CPR (Attempt to Resuscitate)     Medical Interventions (Patient has pulse or is breathing):    Full Support       Jessica Arreola II, DO  04/17/22

## 2022-04-17 NOTE — THERAPY EVALUATION
Patient Name: Saulo Dahl  : 1950    MRN: [id number]                              Today's Date: 2022       Admit Date: 2022    Visit Dx: No diagnosis found.  Patient Active Problem List   Diagnosis   • CAD (coronary artery disease)   • HTN (hypertension)   • HLD (hyperlipidemia)   • GERD without esophagitis   • T2DM (type 2 diabetes mellitus) (HCC)   • Hypothyroidism (acquired)   • Hx of migraines   • Expressive aphasia   • Elevated troponin     Past Medical History:   Diagnosis Date   • Abnormal stress test 2019    Added automatically from request for surgery [id number]   • Coronary artery disease    • Diabetes mellitus (HCC)    • Disease of thyroid gland    • GERD (gastroesophageal reflux disease)    • HLD (hyperlipidemia)    • Hypercholesteremia    • Hypertension    • Migraines      Past Surgical History:   Procedure Laterality Date   • ABDOMINAL HERNIA REPAIR     • CARDIAC CATHETERIZATION     • CARDIAC CATHETERIZATION N/A 3/8/2019    Procedure: Left Heart Cath;  Surgeon: Jamshid Smith MD;  Location: [facility] CATH INVASIVE LOCATION;  Service: Cardiology   • CATARACT EXTRACTION Right    • CIRCUMCISION     • CORONARY ARTERY BYPASS GRAFT     • TONSILLECTOMY     • TOTAL HIP ARTHROPLASTY Right       General Information     Row Name 22 0835          OT Time and Intention    Document Type evaluation  -JY     Mode of Treatment occupational therapy;individual therapy  -JY     Row Name 22 0835          General Information    Patient Profile Reviewed yes  -JY     Prior Level of Function independent:;all household mobility;community mobility;gait;transfer;bed mobility;ADL's;feeding;grooming;dressing;bathing;driving;yard work;mod assist:;home management;cooking;cleaning  I in ADLs, capable of I in IADLs however receives spouse A for convenience, ambulates Marilee w/o AD, actively driving  -JY     Existing Precautions/Restrictions other (see comments)  mildly impulsive in standing and  initial ambulation, chronic R peripheral vision deficit  -JY     Barriers to Rehab visual deficit  chronic R peripheral vision deficit  -JY     Row Name 04/17/22 0835          Occupational Profile    Environmental Supports and Barriers (Occupational Profile) step over tub/shower combo with access to shower chair, standard toilet height, DME: has FWW and reportedly LH AE from prior sx  -JY     Row Name 04/17/22 0835          Living Environment    People in Home spouse;other (see comments)  able to assist as needed  -JY     Row Name 04/17/22 0835          Home Main Entrance    Number of Stairs, Main Entrance one  -JY     Stair Railings, Main Entrance none  -JY     Row Name 04/17/22 0835          Stairs Within Home, Primary    Stairs, Within Home, Primary 0  -JY     Number of Stairs, Within Home, Primary none  -JY     Stair Railings, Within Home, Primary none  -JY     Row Name 04/17/22 0835          Cognition    Orientation Status (Cognition) oriented x 4  -JY     Row Name 04/17/22 0835          Safety Issues, Functional Mobility    Safety Issues Affecting Function (Mobility) impulsivity;safety precaution awareness;safety precautions follow-through/compliance  -JY     Impairments Affecting Function (Mobility) endurance/activity tolerance;balance  -JY     Comment, Safety Issues/Impairments (Mobility) pt alert and able to follow commands; mildly impulsive upon standing and initiating ADL related ambulation, cues for decreased speed  -JY           User Key  (r) = Recorded By, (t) = Taken By, (c) = Cosigned By    Initials Name Provider Type    Rosalie Staples OT Occupational Therapist                 Mobility/ADL's     Row Name 04/17/22 0842          Bed Mobility    Bed Mobility supine-sit;scooting/bridging  -JY     Scooting/Bridging [place] (Bed Mobility) modified independence  -JY     Supine-Sit [place] (Bed Mobility) contact guard;verbal cues  -JY     Assistive Device (Bed Mobility) bed rails;head of bed  elevated  -JY     Comment, (Bed Mobility) maintained HOB elevated and bed rails in place and utilized both with CGA to upright trunk into sitting, able to advance LEs over to edge Marilee; reported mild dizziness/feeling LH upon sittin EOB  -EARL     Row Name 04/17/22 0842          Transfers    Transfers sit-stand transfer;stand-sit transfer;toilet transfer  -JY     Comment, (Transfers) skilled cues to seek out optimal hand placement for controlled ascend and descend largely reaching back prior to sitting and pushing up from seated surface if supplemental support needed  -JY     Sit-Stand [place] (Transfers) standby assist;verbal cues  -JY     Stand-Sit [place] (Transfers) standby assist;verbal cues  -JY     [place] Level (Toilet Transfer) supervision;verbal cues  -JY     Assistive Device (Toilet Transfer) commode;raised toilet seat  -HipFlatJAMIE     Row Name 04/17/22 0842          Sit-Stand Transfer    Assistive Device (Sit-Stand Transfers) other (see comments)  gait belt for safety protocol  -HipFlatJAMIE     Row Name 04/17/22 0842          Stand-Sit Transfer    Assistive Device (Stand-Sit Transfers) other (see comments)  gait belt for safety protocol  -HipFlatJAMIE     Row Name 04/17/22 0842          Toilet Transfer    Type (Toilet Transfer) sit-stand;stand-sit  -HipFlatJAMIE     Row Name 04/17/22 0842          Functional Mobility    Functional Mobility- Ind. Level contact guard assist;verbal cues required  -J     Functional Mobility- Device other (see comments)  gait belt for safety protocol  -J     Functional Mobility- Comment pt presented with increased speed in initial ADL mobility within room requiring cues for decreased pace and fluid turning pivot as pt mildly impulsive; did not use any AD aside from intermittent holding of IV pole to assist in management; able to demo forward, backward, lateral stepping and reaching for and retrieving object off floor; safety cues provided  -[employer]     Row Name 04/17/22 0842          Activities of Daily  Living    BADL Assessment/Intervention upper body dressing;lower body dressing;grooming  -JY     Row Name 04/17/22 0842          Upper Body Dressing Assessment/Training    [place] Level (Upper Body Dressing) doff;don;pajama/robe;minimum assist (75% patient effort);verbal cues  -JY     Position (Upper Body Dressing) unsupported sitting;edge of bed sitting  -JY     Comment, (Upper Body Dressing) min A for management of sleeve around IV (situational) and further for posterior and proximal mgmt  -JY     Row Name 04/17/22 0842          Lower Body Dressing Assessment/Training    [place] Level (Lower Body Dressing) doff;don;socks;supervision;standby assist;verbal cues  -JY     Position (Lower Body Dressing) supported sitting;unsupported sitting;edge of bed sitting  -JY     Comment, (Lower Body Dressing) increased A to d/d R sock d/t increased effort to bring RLE up proximally (figure 4), req'd compensatory position and manual A to pull RLE upward; recommended pt use LH AE at home if reach becomes too difficult or unsafe  -JY     Row Name 04/17/22 0842          Grooming Assessment/Training    [place] Level (Grooming) wash face, hands;set up  -JY     Position (Grooming) supported sitting  -JY           User Key  (r) = Recorded By, (t) = Taken By, (c) = Cosigned By    Initials Name Provider Type    Rosalie Staples OT Occupational Therapist               Obj/Interventions     Row Name 04/17/22 0852          Sensory Assessment (Somatosensory)    Sensory Assessment (Somatosensory) bilateral UE;sensation intact  -JY     Bilateral UE Sensory Assessment general sensation;light touch awareness;light touch localization;intact  -JY     Sensory Assessment denies any numbness or tingling and able to recognize LT stimuli as intact and symmetrical  -JY     Row Name 04/17/22 0852          Vision Assessment/Intervention    Visual Impairment/Limitations corrective lenses full-time;peripheral vision impaired right;other (see  comments)  pt reports chronic R peripheral vision deficits, no gross asymmetry noted during assessment  -JY     Row Name 04/17/22 0852          Range of Motion Comprehensive    General Range of Motion bilateral upper extremity ROM WFL  -JY     Row Name 04/17/22 0852          Strength Comprehensive (MMT)    General Manual Muscle Testing (MMT) Assessment no strength deficits identified  -JY     Comment, General Manual Muscle Testing (MMT) Assessment BUE MMS grossly 5/5 per MMT  -J     Row Name 04/17/22 0852          Motor Skills    Motor Skills functional endurance;coordination  -JY     Coordination bilateral;upper extremity;WFL;finger to nose;other (see comments)  thumb to digit opposition WFL  -JY     Functional Endurance mild decrease in fxl endurance related to more dynamic ADLs, maintained SPO2 > 90% on RA throughout, req'd seated rest break after toileting related mobility  -JY     Row Name 04/17/22 0852          Balance    Balance Assessment sitting static balance;sitting dynamic balance;standing static balance;standing dynamic balance  -JY     Static Sitting Balance independent  -JY     Dynamic Sitting Balance standby assist;other (see comments)  LBD  -JY     Position, Sitting Balance unsupported;sitting edge of bed  -JY     Static Standing Balance standby assist;verbal cues  -JY     Dynamic Standing Balance contact guard;verbal cues  -JY     Position/Device Used, Standing Balance supported;unsupported;other (see comments)  gait belt for safety protocol  -JY     Balance Interventions sitting;standing;static;dynamic;sit to stand;supported;occupation based/functional task  -JY     Comment, Balance no overt LOB during seated or standing tasks  -JY           User Key  (r) = Recorded By, (t) = Taken By, (c) = Cosigned By    Initials Name Provider Type    Rosalie Staples OT Occupational Therapist               Goals/Plan     Row Name 04/17/22 0946          Bed Mobility Goal 1 (OT)    Activity/Assistive Device  (Bed Mobility Goal 1, OT) bed mobility activities, all  -JY     [place] Level/Cues Needed (Bed Mobility Goal 1, OT) modified independence  -JY     Time Frame (Bed Mobility Goal 1, OT) long term goal (LTG);by discharge  -JY     Progress/Outcomes (Bed Mobility Goal 1, OT) goal ongoing  -JY     Row Name 04/17/22 0946          Transfer Goal 1 (OT)    Activity/Assistive Device (Transfer Goal 1, OT) sit-to-stand/stand-to-sit;bed-to-chair/chair-to-bed;toilet;commode  -JY     [place] Level/Cues Needed (Transfer Goal 1, OT) modified independence;verbal cues required  -JY     Time Frame (Transfer Goal 1, OT) long term goal (LTG);by discharge  -JY     Progress/Outcome (Transfer Goal 1, OT) goal ongoing  -JY     Row Name 04/17/22 0946          Dressing Goal 1 (OT)    Activity/Device (Dressing Goal 1, OT) lower body dressing;other (see comments)  d/d pants, undergarments and socks w/ AE PRN  -JY     [place]/Cues Needed (Dressing Goal 1, OT) modified independence;verbal cues required  -JY     Time Frame (Dressing Goal 1, OT) long term goal (LTG);by discharge  -JY     Progress/Outcome (Dressing Goal 1, OT) goal ongoing  -JY     Row Name 04/17/22 0946          Therapy Assessment/Plan (OT)    Planned Therapy Interventions (OT) activity tolerance training;BADL retraining;functional balance retraining;occupation/activity based interventions;patient/caregiver education/training;strengthening exercise;transfer/mobility retraining  -JY           User Key  (r) = Recorded By, (t) = Taken By, (c) = Cosigned By    Initials Name Provider Type    Rosalie Staples, OT Occupational Therapist               Clinical Impression     Row Name 04/17/22 0940          Pain Assessment    Pretreatment Pain Rating 0/10 - no pain  -JY     Posttreatment Pain Rating 2/10  -JY     Pain Location - Side/Orientation Bilateral  -JY     Pain Location posterior  -JY     Pain Location - neck  -JY     Pre/Posttreatment Pain Comment neck pain reported  at end of session with pt reporting hx of pinched nerve, RN aware  -JY     Pain Intervention(s) Repositioned;Ambulation/increased activity  -EARL     Row Name 04/17/22 0940          Plan of Care Review    Plan of Care Reviewed With patient;spouse  -SEBASY     Progress improving  OT IE  -JY     Outcome Evaluation OT eval completed. Pt presents with mild decrease in ADL and related t/f, mobility I compared to PLOF. Pt limited by mild impairments in fxl endurance, balance, safety awareness during more dynamic tasks. Pt completed supine > sitting with CGA, scooting to EOB with MI, STS at EOB w/ SBA, spv at toilet t/f and CGA during fxl mobility w/ cues for safety as pt demo'd mild impulsivity and increased speed. Pt completed UBD w/ min A, d/d socks with spv to SBA w/ increased A at RLE and SUA for hand/face washing. Pt largely at baseline function yet could benefit from IPOT POC to address impairments impacting function and return home w/ A when medically ready.  -EARL     Row Name 04/17/22 0940          Therapy Assessment/Plan (OT)    Patient/Family Therapy Goal Statement (OT) to increase I in ADLs, related t/fs, return to PLOF  -JY     Rehab Potential (OT) good, to achieve stated therapy goals  -J     Criteria for Skilled Therapeutic Interventions Met (OT) yes;skilled treatment is necessary  -JY     Therapy Frequency (OT) daily  -EARL     Row Name 04/17/22 0940          Therapy Plan Review/Discharge Plan (OT)    Anticipated Discharge Disposition (OT) home with assist  -EARL     Row Name 04/17/22 0940          Vital Signs    Pre Systolic BP Rehab 146  -JY     Pre Treatment Diastolic BP 91  -JY     Post Systolic BP Rehab 154  -JY     Post Treatment Diastolic BP 98  -JY     Pretreatment Heart Rate (beats/min) 77  -JY     Posttreatment Heart Rate (beats/min) 67  -JY     Pre SpO2 (%) 96  -JY     O2 Delivery Pre Treatment room air  -JY     O2 Delivery Intra Treatment room air  -JY     Post SpO2 (%) 95  -JY     O2 Delivery Post  Treatment room air  -JY     Pre Patient Position Supine  -JY     Intra Patient Position Standing  -JY     Post Patient Position Sitting  -JY     Row Name 04/17/22 0940          Positioning and Restraints    Pre-Treatment Position in bed  -JY     Post Treatment Position chair  -JY     In Chair notified nsg;reclined;call light within reach;encouraged to call for assist;exit alarm on;with family/caregiver;waffle cushion;legs elevated  -JY           User Key  (r) = Recorded By, (t) = Taken By, (c) = Cosigned By    Initials Name Provider Type    Rosalie Staples OT Occupational Therapist               Outcome Measures     Row Name 04/17/22 0947          How much help from another is currently needed...    Putting on and taking off regular lower body clothing? 3  -JY     Bathing (including washing, rinsing, and drying) 3  -JY     Toileting (which includes using toilet bed pan or urinal) 3  -JY     Putting on and taking off regular upper body clothing 3  -JY     Taking care of personal grooming (such as brushing teeth) 4  -JY     Eating meals 4  -JY     AM-PAC 6 Clicks Score (OT) 20  -JY     Row Name 04/17/22 0947          Modified [place] Scale    Pre-Stroke Modified [place] Scale 6 - Unable to determine (UTD) from the medical record documentation  -JY     Modified [place] Scale 2 - Slight disability.  Unable to carry out all previous activities but able to look after own affairs without assistance.  -JY     Row Name 04/17/22 0947          Functional Assessment    Outcome Measure Options AM-PAC 6 Clicks Daily Activity (OT);Modified [place]  -JY           User Key  (r) = Recorded By, (t) = Taken By, (c) = Cosigned By    Initials Name Provider Type    Rosalie Staples OT Occupational Therapist                Occupational Therapy Education                 Title: PT OT SLP Therapies (In Progress)     Topic: Occupational Therapy (In Progress)     Point: ADL training (In Progress)     Description:   Instruct learner(s) on  proper safety adaptation and remediation techniques during self care or transfers.   Instruct in proper use of assistive devices.              Learning Progress Summary           Patient Acceptance, E,D, NR by EARL at 4/17/2022 0749   Family Acceptance, E,D, NR by EARL at 4/17/2022 0749                   Point: Home exercise program (Not Started)     Description:   Instruct learner(s) on appropriate technique for monitoring, assisting and/or progressing therapeutic exercises/activities.              Learner Progress:  Not documented in this visit.          Point: Precautions (In Progress)     Description:   Instruct learner(s) on prescribed precautions during self-care and functional transfers.              Learning Progress Summary           Patient Acceptance, E,D, NR by EARL at 4/17/2022 0749   Family Acceptance, E,D, NR by EARL at 4/17/2022 0749                   Point: Body mechanics (In Progress)     Description:   Instruct learner(s) on proper positioning and spine alignment during self-care, functional mobility activities and/or exercises.              Learning Progress Summary           Patient Acceptance, E,D, NR by EARL at 4/17/2022 0749   Family Acceptance, E,D, NR by EARL at 4/17/2022 0749                               User Key     Initials Effective Dates Name Provider Type Discipline    EARL 06/16/21 -  Rosalie Ivy OT Occupational Therapist OT              OT Recommendation and Plan  Planned Therapy Interventions (OT): activity tolerance training, BADL retraining, functional balance retraining, occupation/activity based interventions, patient/caregiver education/training, strengthening exercise, transfer/mobility retraining  Therapy Frequency (OT): daily  Plan of Care Review  Plan of Care Reviewed With: patient, spouse  Progress: improving (OT IE)  Outcome Evaluation: OT eval completed. Pt presents with mild decrease in ADL and related t/f, mobility I compared to PLOF. Pt limited by mild impairments in fxl  endurance, balance, safety awareness during more dynamic tasks. Pt completed supine > sitting with CGA, scooting to EOB with MI, STS at EOB w/ SBA, spv at toilet t/f and CGA during fxl mobility w/ cues for safety as pt demo'd mild impulsivity and increased speed. Pt completed UBD w/ min A, d/d socks with spv to SBA w/ increased A at RLE and SUA for hand/face washing. Pt largely at baseline function yet could benefit from IPOT POC to address impairments impacting function and return home w/ A when medically ready.     Time Calculation:     Therapy Charges for Today     Code Description Service Date Service Provider Modifiers Qty    31548634099 HC OT EVAL LOW COMPLEXITY 4 4/17/2022 Rosalie Iyv OT GO 1    94083645648 HC OT SELF CARE/MGMT/TRAIN EA 15 MIN 4/17/2022 Rosalie Ivy OT GO 1               Rosalie Ivy OT  4/17/2022

## 2022-04-17 NOTE — PROGRESS NOTES
HEPARIN INFUSION  Saulo Dahl is a  72 y.o. male receiving heparin infusion.             Therapy for (VTE/Cardiac):   cardiac  Patient Weight: 98 kg   Initial Bolus (Y/N):   n (per stroke navigator)  Any Bolus (Y/N):   n        Signs or Symptoms of Bleeding: none noted per Alonzo LEZAMA      Cardiac or Other (Not VTE)   Initial Bolus: 60 units/kg (Max 4,000 units)  Initial rate: 12 units/kg/hr (Max 1,000 units/hr)   Anti-Xa (IU/mL) Bolus Dose Stop Infusion Rate Change Repeat Anti-Xa      ?0.19 60 units/kg 0 hrs Increase rate by   4 units/kg/hr 6 hrs       0.2 - 0.29  30 units/kg 0 hrs Increase rate by 2 units/kg/hr 6 hrs    0.3 - 0.7 0 0 hrs No change 6 hrs      0.71 - 0.99 0  0 hrs Decrease rate by 2 units/kg/hr 6 hrs            ?1 0 Hold 1 hr Decrease rate by 3 units/kg/hr 6 hrs      Recommend Xa every 6 hours.       Results from last 7 days   Lab Units 04/17/22  1427 04/16/22  1817 04/16/22  1812   INR  1.08  --   --    HEMOGLOBIN g/dL 10.0*  --  10.7*   HEMOGLOBIN, POC g/dL  --  12.9  --    HEMATOCRIT % 30.5*  --  33.9*   HEMATOCRIT POC %  --  38  --    PLATELETS 10*3/mm3 202  --  190          Date   Time   Anti-Xa Current Rate (Unit/kg/hr) Bolus   (Units) Rate Change   (Unit/kg/hr) New Rate (Unit/kg/hr) Next   Anti-Xa Comments  Pump Check Daily   4/17 0145 STAT new 0 10.2 10.2 0800 D/w RN   4/17 1508 0.10 10.2 -- +3.8 14 2200 robin Chavis verified       --           --           --           --           --           --           --                                                                                                                                             Isauro Jones, Javon  4/17/2022  15:23 EDT

## 2022-04-17 NOTE — H&P
"    [facility] Medicine Services  HISTORY AND PHYSICAL    Patient Name: Saulo Dahl  : 1950  MRN: [id number]  Primary Care Physician: Han Ramirez MD  Date of admission: 2022    Subjective   Subjective     Chief Complaint:  Slurred speech     HPI:  Saulo Dahl is a 72 y.o. male w/ a hx of CAD (s/p CABG, stent x1), HTN, HLD, T2DM, hypothyroidism, GERD, migraines who presented to the ED w/ c/o slurred speech.   Pt w/ a hx of migraines. He reports that he usually has associated blurred vision when he develops a migraine. Today around 12 or 1pm pt felt like he was getting a migraine. He drank some coffee and rested and reports that his headache improved. Around 4:30pm he remembers watching television when he developed \"mild\" blurred vision and couldn't comprehend the number and letters on the TV. His wife reports that she was sitting at the computer and the pt walked up behind her and when she turned around he attempted to speak but his words were \"slurred and gibberish\". Pt describes that he knew what he wanted to say to his wife but couldn't \"get the words out\". Pt's wife brought him to the ED for further evaluation. When they arrived to the ED, the pt's speech returned to baseline. At time of exam, pt denies headache, blurred vision or speech issues. Pt denies facial droop, unilateral weakness, numbness or tingling.   Pt denies fever/chills, chest pain, dyspnea, cough, N/V/D, abdominal pain, dysuria, edema, syncope.   Pt evaluated in the ED. CT head unremarkable. MRI Brain pending. Pt admitted to the hospital medicine service for further evaluation.     Review of Systems   Constitutional: Negative.  Negative for chills, diaphoresis, fatigue and fever.   HENT: Negative for congestion, postnasal drip, rhinorrhea, sinus pressure, sinus pain, sneezing, sore throat and trouble swallowing.    Eyes: Negative.  Negative for visual disturbance.   Respiratory: Negative.  " Negative for cough, shortness of breath and wheezing.    Cardiovascular: Negative.  Negative for chest pain, palpitations and leg swelling.   Gastrointestinal: Negative.  Negative for abdominal distention, abdominal pain, blood in stool, constipation, diarrhea, nausea and vomiting.   Endocrine: Negative.    Genitourinary: Negative.  Negative for decreased urine volume, difficulty urinating, dysuria, flank pain, frequency, hematuria and urgency.   Musculoskeletal: Negative.  Negative for arthralgias, myalgias, neck pain and neck stiffness.   Skin: Negative.  Negative for wound.   Allergic/Immunologic: Negative.  Negative for immunocompromised state.   Neurological: Positive for speech difficulty and headaches. Negative for dizziness, tremors, seizures, syncope, facial asymmetry, weakness, light-headedness and numbness.   Hematological: Negative.  Does not bruise/bleed easily.   Psychiatric/Behavioral: Negative.  Negative for confusion.   All other systems reviewed and are negative.     Personal History     Past Medical History:   Diagnosis Date   • Abnormal stress test 03/07/2019    Added automatically from request for surgery [id number]   • Coronary artery disease    • Diabetes mellitus (HCC)    • Disease of thyroid gland    • GERD (gastroesophageal reflux disease)    • HLD (hyperlipidemia)    • Hypercholesteremia    • Hypertension    • Migraines      Past Surgical History:   Procedure Laterality Date   • ABDOMINAL HERNIA REPAIR     • CARDIAC CATHETERIZATION     • CARDIAC CATHETERIZATION N/A 3/8/2019    Procedure: Left Heart Cath;  Surgeon: Jamshid Smith MD;  Location: [facility] CATH INVASIVE LOCATION;  Service: Cardiology   • CATARACT EXTRACTION Right    • CIRCUMCISION     • CORONARY ARTERY BYPASS GRAFT     • TONSILLECTOMY     • TOTAL HIP ARTHROPLASTY Right      Family History:  family history includes Diabetes in his father; Heart disease in his father and mother; Hypertension in his father and mother. Otherwise  pertinent FHx was reviewed and unremarkable.     Social History:  reports that he has never smoked. He has never used smokeless tobacco. He reports that he does not drink alcohol and does not use drugs.  , retired  Medications:  Omega-3 Fatty Acids, Vitamin D, aspirin, atorvastatin, clopidogrel, doxazosin, gabapentin, glimepiride, ibuprofen, levothyroxine, metFORMIN, omeprazole, oxybutynin XL, and spironolactone    Allergies   Allergen Reactions   • Lactose Intolerance (Gi) GI Intolerance     Objective   Objective     Vital Signs:   Temp:  [96.8 °F (36 °C)] 96.8 °F (36 °C)  Heart Rate:  [57-84] 57  Resp:  [17] 17  BP: (155-189)/(79-99) 189/99    Physical Exam     Constitutional: Awake, alert; non-toxic appearing   Eyes: PERRLA, sclerae anicteric, no conjunctival injection  HENT: NCAT, mucous membranes moist  Neck: Supple, no thyromegaly, no lymphadenopathy, trachea midline  Respiratory: Clear to auscultation bilaterally, nonlabored respirations   Cardiovascular: RRR, no murmurs, rubs, or gallops, no peripheral edema   Gastrointestinal: Positive bowel sounds, soft, nontender, nondistended  Musculoskeletal: Normal ROM bilaterally   Psychiatric: Appropriate affect, cooperative  Neurologic: Oriented x 3, strength symmetric in all extremities, Cranial Nerves grossly intact to confrontation, speech clear  Skin: No rashes, lesions or wounds     Result Review:  I have personally reviewed the results from the time of this admission to 04/16/22 9:00 PM EDT and agree with these findings:  [x]  Laboratory  []  Microbiology  [x]  Radiology  [x]  EKG/Telemetry   []  Cardiology/Vascular   []  Pathology  []  Old records    LAB RESULTS:      Lab 04/16/22 1817 04/16/22 1812   WBC  --  5.91   HEMOGLOBIN  --  10.7*   HEMOGLOBIN, POC 12.9  --    HEMATOCRIT  --  33.9*   HEMATOCRIT POC 38  --    PLATELETS  --  190   NEUTROS ABS  --  1.68*   IMMATURE GRANS (ABS)  --  0.02   LYMPHS ABS  --  3.46*   MONOS ABS  --  0.41   EOS ABS  --   0.30   MCV  --  72.7*   APTT  --  30.4         Lab 04/16/22  1817 04/16/22 1810   CREATININE 1.20 1.10   EGFR 63.9  --          Lab 04/16/22 1812   ALT (SGPT) 23   AST (SGOT) 37         Lab 04/16/22 2120 04/16/22 1812   TROPONIN T 0.044* 0.039*                   Microbiology Results (last 10 days)     ** No results found for the last 240 hours. **        CT Angiogram Neck    Result Date: 4/16/2022  DATE OF EXAM: 4/16/2022 6:04 PM  PROCEDURE: CT ANGIOGRAM NECK-, CT ANGIOGRAM HEAD W AI ANALYSIS OF LVO-  INDICATIONS: Stroke, follow up  COMPARISON: CT head and CT perfusion 4/16/2022  TECHNIQUE: CTA of the head and CTA of the neck was performed after the intravenous administration of Isovue 370. Reconstructed coronal and sagittal images were also obtained. In addition, a 3-D volume rendered image was obtained after post processing. Automated exposure control and iterative reconstruction methods were used. AI analysis of LVO was utilized for the CTA Head imaging portion of the study.  The radiation dose reduction device was turned on for each scan per the ALARA (As Low as Reasonably Achievable) protocol.  Stenosis measurement was performed by the NASCET or similar method.  FINDINGS: CTA neck: Aortic arch appears within normal limits.  Origins of the great vessels are patent.  Left vertebral artery takes origin directly from the arch just proximal to the origin of the left subclavian. Portions of the brachycephalic artery right common carotid artery are obscured by beam hardening artifact from dense contrast within the right subclavian vein and superior vena cava.  Visualized portions of the right common carotid and subclavian arteries appear patent.  There is no significant plaque the right carotid bifurcation.  Right internal and external carotid arteries are patent without significant stenosis.  Left common carotid artery is patent without focal stenosis.  There is mild calcified plaque at the left carotid  bifurcation resulting in less than 50% stenosis.  Remaining portions of the left internal carotid artery are patent without focal stenosis.  Visualized left subclavian artery appears patent.  Vertebral arteries are codominant and patent to the level of the basilar without evidence of stenosis.  Unenhanced soft tissues of the neck appear within normal limits. Visualized lung apices  CTA HEAD:  Intracranial bilateral internal carotid arteries appear patent without focal stenosis.  Bilateral anterior cerebral and middle cerebral arteries appear patent without focal stenosis.  No anterior circulation aneurysm is seen.  Basilar artery is patent without focal stenosis.  Bilateral posterior cerebral arteries appear patent without focal stenosis.  Superior cerebellar arteries appear patent.  No posterior circulation aneurysm identified.  There is no pathologic intracranial contrast enhancement.  Globes and orbits appear within normal          Impression:  1.  Calcified plaque the left carotid bifurcation resulting in approximately 50% stenosis of the left internal carotid artery at its origin. 2.  No hemodynamically significant stenosis of the right carotid or vertebral arteries. 3.  No intracranial vascular stenosis, occlusion, or aneurysm. 4.  No pathologic contrast enhancement.  This report was finalized on 4/16/2022 6:35 PM by Jesse Bucio MD.      XR Chest 1 View    Result Date: 4/16/2022  XR CHEST 1 VW-  Date of Exam: 4/16/2022 5:57 PM  Indication: Acute Stroke Protocol (onset < 12 hrs).  Comparison:?None available.  Technique:?A single view of the chest was obtained.  FINDINGS:  ?Patient status post median sternotomy.  Heart size and pulmonary vessels are within normal limits.  Lungs are clear bilaterally.  No pleural effusion or pneumothorax.        Impression:  1. No acute cardiopulmonary disease.   This report was finalized on 4/16/2022 6:41 PM by Jesse Bucio MD.      CT Head Without Contrast Stroke  Protocol    Result Date: 4/16/2022  CT HEAD WO CONTRAST STROKE PROTOCOL-  Date of Exam: 4/16/2022 5:56 PM  Indication: Neuro deficit, acute, stroke suspected.  Comparison Exams: 4/16/2015  Technique: Multiple axial images were obtained from the skull base to the vertex without the administration of IV contrast. The axial data was used to generate reformatted images in the coronal and sagittal planes. Automated exposure control and iterative reconstruction methods were used.  FINDINGS: There is no acute infarct or hemorrhage.   No abnormal extra-axial fluid collections are seen.   There is no mass effect or hydrocephalus.  There is no evidence of skull fracture.   Visualized paranasal sinuses and mastoid air cells are clear.  The globes and orbits are within normal limits.      Impression:  1. No acute intracranial abnormality.  Findings personally communicated to Dannielle with stroke team at 5:58 PM on 4/16/2022  This report was finalized on 4/16/2022 6:02 PM by Jesse Bucio MD.      CT Angiogram Head w AI Analysis of LVO    Result Date: 4/16/2022  DATE OF EXAM: 4/16/2022 6:04 PM  PROCEDURE: CT ANGIOGRAM NECK-, CT ANGIOGRAM HEAD W AI ANALYSIS OF LVO-  INDICATIONS: Stroke, follow up  COMPARISON: CT head and CT perfusion 4/16/2022  TECHNIQUE: CTA of the head and CTA of the neck was performed after the intravenous administration of Isovue 370. Reconstructed coronal and sagittal images were also obtained. In addition, a 3-D volume rendered image was obtained after post processing. Automated exposure control and iterative reconstruction methods were used. AI analysis of LVO was utilized for the CTA Head imaging portion of the study.  The radiation dose reduction device was turned on for each scan per the ALARA (As Low as Reasonably Achievable) protocol.  Stenosis measurement was performed by the NASCET or similar method.  FINDINGS: CTA neck: Aortic arch appears within normal limits.  Origins of the great vessels are  patent.  Left vertebral artery takes origin directly from the arch just proximal to the origin of the left subclavian. Portions of the brachycephalic artery right common carotid artery are obscured by beam hardening artifact from dense contrast within the right subclavian vein and superior vena cava.  Visualized portions of the right common carotid and subclavian arteries appear patent.  There is no significant plaque the right carotid bifurcation.  Right internal and external carotid arteries are patent without significant stenosis.  Left common carotid artery is patent without focal stenosis.  There is mild calcified plaque at the left carotid bifurcation resulting in less than 50% stenosis.  Remaining portions of the left internal carotid artery are patent without focal stenosis.  Visualized left subclavian artery appears patent.  Vertebral arteries are codominant and patent to the level of the basilar without evidence of stenosis.  Unenhanced soft tissues of the neck appear within normal limits. Visualized lung apices  CTA HEAD:  Intracranial bilateral internal carotid arteries appear patent without focal stenosis.  Bilateral anterior cerebral and middle cerebral arteries appear patent without focal stenosis.  No anterior circulation aneurysm is seen.  Basilar artery is patent without focal stenosis.  Bilateral posterior cerebral arteries appear patent without focal stenosis.  Superior cerebellar arteries appear patent.  No posterior circulation aneurysm identified.  There is no pathologic intracranial contrast enhancement.  Globes and orbits appear within normal          Impression:  1.  Calcified plaque the left carotid bifurcation resulting in approximately 50% stenosis of the left internal carotid artery at its origin. 2.  No hemodynamically significant stenosis of the right carotid or vertebral arteries. 3.  No intracranial vascular stenosis, occlusion, or aneurysm. 4.  No pathologic contrast enhancement.   This report was finalized on 4/16/2022 6:35 PM by Jesse Bucio MD.      CT CEREBRAL PERFUSION WITH & WITHOUT CONTRAST    Result Date: 4/16/2022  CT CEREBRAL PERFUSION W WO CONTRAST-  Date of Exam: 4/16/2022 6:04 PM  Indication: Neuro deficit, acute, stroke suspected.  Comparison Exams: CT head without contrast 4/6/2022  TECHNIQUE: Perfusion imaging was performed of the brain after the administration of IV contrast.  Low-dose CT acquisition technique included one of the following options: 1. Automated exposure control, 2. Adjustment of mA and/or KV according to patient's size and/or 3. Use of iterative reconstruction.     CT PERFUSION BRAIN:  Cerebral blood flow, blood volume and mean transit time maps are symmetric without large perfusion defect.  CBF<30% volume: 0 mL Tmax>6sec volume: 0 mL Mismatch volume: 0 mL Mismatch ratio: None          Impression:   1.  No perfusion abnormality seen to suggest ischemia or core infarct.  This report was finalized on 4/16/2022 6:25 PM by Jesse Bucio MD.          Assessment/Plan   Assessment & Plan       Expressive aphasia    CAD (coronary artery disease)    HTN (hypertension)    HLD (hyperlipidemia)    GERD without esophagitis    T2DM (type 2 diabetes mellitus) (HCC)    Hypothyroidism (acquired)    Hx of migraines    Elevated troponin    Saulo Dahl is a 72 y.o. male w/ a hx of CAD (s/p CABG, stent x1), HTN, HLD, T2DM, hypothyroidism, GERD, migraines who presented to the ED w/ c/o slurred speech.     **Expressive aphasia; r/o CVA   **Hx of migraines   -pt back to baseline   -CT head unremarkable   -CTA Head/Neck (50% stenosis of the left internal carotid artery)  -MRI Brain shows no infarct  -ECHO pending   -Stroke Navigator following   -Neurology consult in am   -pt,ot, speech and case mgt consult   -ASA, statin, plavix  -neuro checks and stroke scale per protocol   -bedside dysphagia screen   -fall precautions   -baseline CXR unremarkable, UA pending   -consider  eeg    **Elevated troponin   LV strain, loss of lateral T waves  -initial troponin 0.039; went up  -EKG stable; (prolonged Qt); repeat in am   -ASA   -continue ASA, Plavix, statin   -ECHO pending   -consider Cardiology consult pending troponin trend   -adding nitropaste for BP control  Results for orders placed during the hospital encounter of 03/08/19  Cardiac Catheterization/Vascular Study  Narrative  Procedure: #1 left heart catheterization coronary angiography left ventriculography  2.  Saphenous vein graft angiography  3.  Left internal mammary artery angiography  4.  PTCA distal LAD through LIMA graft  5.  Drug-eluting stent deployment x2 distal and proximal body of saphenous vein graft to circumflex coronary artery  6.  Thoracic aortography  Conclusion: Successful PTCA of distal LAD through LIMA graft and successful drug-eluting stent deployment distal and proximal body of saphenous vein graft to circumflex coronary artery for progressive angina in this 68-year-old gentleman.  Patient will be initiated on dual antiplatelet therapy as well as statin to go along with risk factor modification and medical therapy for hypertension and diabetes.      **HTN emergency?  **HLD  **CAD (3V CABG 2011, s/p stent x1 ~ 6 yrs ago)   -serial EKG and troponins  -ASA  -continue statin, plavix   -holding routine aldactone for now   -ECHO pending (none on record)    **T2DM  -hem a1c pending   -holding routine Amaryl and metformin   -FSBG q ac/hs w/ LDSS     **GERD  -continue Prilosec (sunbProtonix)     **Hypothyroidism   -tsh pending   -continue synthroid     DVT prophylaxis:  Mechanical     CODE STATUS:    Code Status (Patient has no pulse and is not breathing): CPR (Attempt to Resuscitate)  Medical Interventions (Patient has pulse or is breathing): Full Support    This note has been completed as part of a split-shared workflow.   Signature: Electronically signed by ALEX Hercules, 04/16/22, 9:16 PM EDT.      Attending    Admission Attestation       I have seen and examined the patient, performing an independent face-to-face diagnostic evaluation with plan of care reviewed and developed with the advanced practice clinician (APC).      Brief Summary Statement:     Saulo Dahl is a 72 y.o. male w/ a hx of CAD (s/p CABG, stent x1), HTN, HLD, T2DM, hypothyroidism, GERD, migraines who presented to the ED w/ c/o slurred speech.   Pt w/ a hx of migraines.Patient usually loses vision then gets a headache. Today had that and then trouble talking, all resolve now. HE says his BP is usually normal, he had been taken off his aldactone.     Remainder of detailed HPI is as noted by APC and has been reviewed and/or edited by me for completeness.    Attending Physical Exam:  Temp:  [96.8 °F (36 °C)-97.7 °F (36.5 °C)] 97.7 °F (36.5 °C)  Heart Rate:  [57-84] 79  Resp:  [17-18] 18  BP: (155-189)/(79-99) 172/98    Patient is alert and talkative in no distress at rest- his speech is clear but slow and deliberate  Neck is without mass or JVD  Heart is Reg wo murmur  Lungs are clear wo wheeze or crackle  Abd is soft without HSM or mass, not tender or distended  MAEW  Skin is without rash  Neurologic is exam in non-focal   Mood is appropriate      Brief Assessment/Plan :  See detailed assessment and plan developed with APC which I have reviewed and/or edited for completeness.    Admission Status: I believe that this patient meets Observation Status.    Avis Patel MD  04/16/22

## 2022-04-17 NOTE — THERAPY EVALUATION
Patient Name: Saulo Dahl  : 1950    MRN: [id number]                              Today's Date: 2022       Admit Date: 2022    Visit Dx: No diagnosis found.  Patient Active Problem List   Diagnosis   • CAD (coronary artery disease)   • HTN (hypertension)   • HLD (hyperlipidemia)   • GERD without esophagitis   • T2DM (type 2 diabetes mellitus) (HCC)   • Hypothyroidism (acquired)   • Hx of migraines   • Expressive aphasia   • Elevated troponin     Past Medical History:   Diagnosis Date   • Abnormal stress test 2019    Added automatically from request for surgery [id number]   • Coronary artery disease    • Diabetes mellitus (HCC)    • Disease of thyroid gland    • GERD (gastroesophageal reflux disease)    • HLD (hyperlipidemia)    • Hypercholesteremia    • Hypertension    • Migraines      Past Surgical History:   Procedure Laterality Date   • ABDOMINAL HERNIA REPAIR     • CARDIAC CATHETERIZATION     • CARDIAC CATHETERIZATION N/A 3/8/2019    Procedure: Left Heart Cath;  Surgeon: Jamshid Smith MD;  Location: [facility] CATH INVASIVE LOCATION;  Service: Cardiology   • CATARACT EXTRACTION Right    • CIRCUMCISION     • CORONARY ARTERY BYPASS GRAFT     • TONSILLECTOMY     • TOTAL HIP ARTHROPLASTY Right       General Information     Row Name 22          Physical Therapy Time and Intention    Document Type evaluation  -ARTHUR     Mode of Treatment individual therapy;physical therapy  -ARTHUR     Row Name 22          General Information    Patient Profile Reviewed yes  -ARTHUR     Prior Level of Function independent:;all household mobility;transfer;bed mobility;ADL's  -ARTHUR     Existing Precautions/Restrictions other (see comments)  slightly impulsive  -ARTHUR     Barriers to Rehab none identified  -ARTHUR     Row Name 22          Living Environment    People in Home spouse  -ARTHUR     Row Name 22          Home Main Entrance    Number of Stairs, Main Entrance one  -ARTHUR     Stair  Railings, Main Entrance none  -ARTHUR     Row Name 04/17/22 0920          Stairs Within Home, Primary    Stairs, Within Home, Primary 0  -ARTHUR     Number of Stairs, Within Home, Primary none  -ARTHUR     Row Name 04/17/22 0920          Cognition    Orientation Status (Cognition) oriented x 4  -ARTHUR     Row Name 04/17/22 0920          Safety Issues, Functional Mobility    Safety Issues Affecting Function (Mobility) impulsivity;safety precaution awareness  -ARTHUR     Impairments Affecting Function (Mobility) endurance/activity tolerance;balance  -ARTHUR           User Key  (r) = Recorded By, (t) = Taken By, (c) = Cosigned By    Initials Name Provider Type    ARTHUR Lukasz Grider, PT Physical Therapist               Mobility     Row Name 04/17/22 0920          Bed Mobility    Comment, (Bed Mobility) Received [facility]  -ARTHUR     Row Name 04/17/22 0920          Sit-Stand Transfer    Sit-Stand [place] (Transfers) verbal cues;supervision  -ARTHUR     Comment, (Sit-Stand Transfer) Verbal cues for safe hand placement during standing/sitting  -ARTHUR     Row Name 04/17/22 0920          Gait/Stairs (Locomotion)    [place] Level (Gait) supervision;verbal cues  -ARTHUR     Distance in Feet (Gait) 360  -ARTHUR     Deviations/Abnormal Patterns (Gait) bilateral deviations;rima decreased;gait speed decreased;stride length decreased  -ARTHUR     Bilateral Gait Deviations forward flexed posture  -ARTHUR     [place] Level (Stairs) verbal cues;supervision  -ARTHUR     Handrail Location (Stairs) none  -ARTHUR     Number of Steps (Stairs) 2  -ARTHUR     Ascending Technique (Stairs) step-over-step  -ARTHUR     Descending Technique (Stairs) step-over-step  -ARTHUR     Comment, (Gait/Stairs) Pt ambulated with step through pattern and decreased speed. Verbal cues for maintaining upright posture, and increase step length. Pt ascended/descended 2 steps using no AD and supervision. Gait/stair training limited by fatigue. No dizziness/LH noted.  -ARTHUR           User Key  (r) = Recorded By, (t) = Taken By,  (c) = Cosigned By    Initials Name Provider Type    Lukasz Bryant, PT Physical Therapist               Obj/Interventions     Row Name 04/17/22 0920          Range of Motion Comprehensive    General Range of Motion bilateral lower extremity ROM WFL  -     Row Name 04/17/22 0920          Strength Comprehensive (MMT)    General Manual Muscle Testing (MMT) Assessment lower extremity strength deficits identified  -ARTHUR     Comment, General Manual Muscle Testing (MMT) Assessment R LE grossly 4/5; L LE grossly 5/5  -ARTHUR     Row Name 04/17/22 0920          Balance    Balance Assessment sit to stand dynamic balance;standing static balance;standing dynamic balance  -ARTHUR     Sit to Stand Dynamic Balance supervision;verbal cues  -ARTHUR     Static Standing Balance verbal cues;supervision  -ARTHUR     Dynamic Standing Balance supervision;verbal cues  -ARTHUR           User Key  (r) = Recorded By, (t) = Taken By, (c) = Cosigned By    Initials Name Provider Type    Lukasz Bryant, PT Physical Therapist               Goals/Plan     Row Name 04/17/22 0920          Bed Mobility Goal 1 (PT)    Activity/Assistive Device (Bed Mobility Goal 1, PT) sit to supine/supine to sit  -ARTHUR     [place] Level/Cues Needed (Bed Mobility Goal 1, PT) modified independence  -ARTHUR     Time Frame (Bed Mobility Goal 1, PT) long term goal (LTG);3 days  -     Row Name 04/17/22 0920          Transfer Goal 1 (PT)    Activity/Assistive Device (Transfer Goal 1, PT) sit-to-stand/stand-to-sit  -ARTHUR     [place] Level/Cues Needed (Transfer Goal 1, PT) modified independence  -ARTHUR     Time Frame (Transfer Goal 1, PT) long term goal (LTG);3 days  -     Row Name 04/17/22 0920          Gait Training Goal 1 (PT)    Activity/Assistive Device (Gait Training Goal 1, PT) gait (walking locomotion)  -ARTHUR     [place] Level (Gait Training Goal 1, PT) modified independence  -ARTHUR     Distance (Gait Training Goal 1, PT) 500 feet  -ARTHUR     Time Frame (Gait Training Goal 1, PT) long term  goal (LTG);3 days  -ARTHUR     Row Name 04/17/22 0920          Stairs Goal 1 (PT)    Activity/Assistive Device (Stairs Goal 1, PT) stairs, all skills  -ARTHUR     [place] Level/Cues Needed (Stairs Goal 1, PT) modified independence  -ARTHUR     Number of Stairs (Stairs Goal 1, PT) 1  -ARTHUR     Time Frame (Stairs Goal 1, PT) long term goal (LTG);3 days  -ARTHUR     Row Name 04/17/22 0920          Therapy Assessment/Plan (PT)    Planned Therapy Interventions (PT) balance training;bed mobility training;gait training;home exercise program;patient/family education;transfer training;stair training;strengthening  -ARTHUR           User Key  (r) = Recorded By, (t) = Taken By, (c) = Cosigned By    Initials Name Provider Type    Lukasz Bryant, PT Physical Therapist               Clinical Impression     Row Name 04/17/22 0920          Pain    Pretreatment Pain Rating 0/10 - no pain  -ARTHUR     Posttreatment Pain Rating 0/10 - no pain  -ARTHUR     Row Name 04/17/22 0920          Therapy Assessment/Plan (PT)    Patient/Family Therapy Goals Statement (PT) To return home  -ARTHUR     Rehab Potential (PT) good, to achieve stated therapy goals  -ARTHUR     Criteria for Skilled Interventions Met (PT) yes;meets criteria;skilled treatment is necessary  -     Therapy Frequency (PT) daily  -ARTHUR     Row Name 04/17/22 0920          Vital Signs    Pre Systolic BP Rehab 153  -ARTHUR     Pre Treatment Diastolic BP 89  -ARTHUR     Post Systolic BP Rehab 180  -ARTHUR     Post Treatment Diastolic BP 93  -ARTHUR     Pre Patient Position Sitting  -ARTHUR     Intra Patient Position Standing  -ARTHUR     Post Patient Position Sitting  -ARTHUR     Row Name 04/17/22 0920          Positioning and Restraints    Pre-Treatment Position sitting in chair/recliner  -ARTHUR     Post Treatment Position chair  -ARTHUR     In Chair notified nsg;call light within reach;encouraged to call for assist;sitting;with family/caregiver  -ARTHUR           User Key  (r) = Recorded By, (t) = Taken By, (c) = Cosigned By    Initials Name Provider  Type    ARTHUR Lukasz Grider, PT Physical Therapist               Outcome Measures     Row Name 04/17/22 0920          How much help from another person do you currently need...    Turning from your back to your side while in flat bed without using bedrails? 4  -ARTHUR     Moving from lying on back to sitting on the side of a flat bed without bedrails? 4  -ARTHUR     Moving to and from a bed to a chair (including a wheelchair)? 4  -ARTHUR     Standing up from a chair using your arms (e.g., wheelchair, bedside chair)? 4  -ARTHUR     Climbing 3-5 steps with a railing? 3  -ARTHUR     To walk in hospital room? 3  -ARTHUR     AM-PAC 6 Clicks Score (PT) 22  -     Row Name 04/17/22 0947 04/17/22 0920       Modified Curry Scale    Pre-Stroke Modified Curry Scale 6 - Unable to determine (UTD) from the medical record documentation  -JY 6 - Unable to determine (UTD) from the medical record documentation  -    Modified Curry Scale 2 - Slight disability.  Unable to carry out all previous activities but able to look after own affairs without assistance.  -JY 1 - No significant disability despite symptoms.  Able to carry out all usual duties and activities.  -    Row Name 04/17/22 0947 04/17/22 0920       Functional Assessment    Outcome Measure Options AM-PAC 6 Clicks Daily Activity (OT);Modified [place]  -JY AM-PAC 6 Clicks Basic Mobility (PT)  -          User Key  (r) = Recorded By, (t) = Taken By, (c) = Cosigned By    Initials Name Provider Type    Lukasz Bryant, PT Physical Therapist    Rosalie Staples OT Occupational Therapist                             Physical Therapy Education                 Title: PT OT SLP Therapies (In Progress)     Topic: Physical Therapy (Done)     Point: Mobility training (Done)     Learning Progress Summary           Patient Acceptance, E,D, VU by ARTHUR at 4/17/2022 0920    Comment: Educated on safe sequencing with ambulatory transfers, gait, and stair training.                   Point: Home exercise program (Done)      Learning Progress Summary           Patient Acceptance, E,D, VU by ARTHUR at 4/17/2022 0920    Comment: Educated on safe sequencing with ambulatory transfers, gait, and stair training.                   Point: Body mechanics (Done)     Learning Progress Summary           Patient Acceptance, E,D, VU by ARTHUR at 4/17/2022 0920    Comment: Educated on safe sequencing with ambulatory transfers, gait, and stair training.                   Point: Precautions (Done)     Learning Progress Summary           Patient Acceptance, E,D, VU by ARTHUR at 4/17/2022 0920    Comment: Educated on safe sequencing with ambulatory transfers, gait, and stair training.                               User Key     Initials Effective Dates Name Provider Type Discipline     06/16/21 -  Lukasz Grider, PT Physical Therapist PT              PT Recommendation and Plan  Planned Therapy Interventions (PT): balance training, bed mobility training, gait training, home exercise program, patient/family education, transfer training, stair training, strengthening  Plan of Care Reviewed With: patient, spouse  Progress: improving  Outcome Evaluation: PT eval complete. Pt ambulated 360 feet using no AD and supervision. Pt ascended/descended 2 steps using no AD and supervision. Gait/stair training limited by fatigue. No dizziness/LH noted. STS performed with supervision. Slight increase in BP to 180/93 following PT, RN notified. Functionally, pt safe to d/c home with assist today from a PT perspective.     Time Calculation:    PT Charges     Row Name 04/17/22 0920             Time Calculation    Start Time 0920  -ARTHUR      PT Received On 04/17/22  -      PT Goal Re-Cert Due Date 04/27/22  -              Time Calculation- PT    Total Timed Code Minutes- PT 10 minute(s)  -ARTHUR              Timed Charges    02052 - Gait Training Minutes  10  -ARTHUR              Untimed Charges    PT Eval/Re-eval Minutes 34  -ARTHUR              Total Minutes    Timed Charges Total Minutes 10  -ARTHUR       Untimed Charges Total Minutes 34  -ARTHUR       Total Minutes 44  -ARTHUR            User Key  (r) = Recorded By, (t) = Taken By, (c) = Cosigned By    Initials Name Provider Type    Lukasz Bryant, PT Physical Therapist              Therapy Charges for Today     Code Description Service Date Service Provider Modifiers Qty    [id number]  GAIT TRAINING EA 15 MIN 4/17/2022 Lukasz Grider, PT GP 1    [id number]  PT EVAL LOW COMPLEXITY 3 4/17/2022 Lukasz Grider, PT GP 1          PT G-Codes  Outcome Measure Options: AM-PAC 6 Clicks Daily Activity (OT), Modified [place]  AM-PAC 6 Clicks Score (PT): 22  AM-PAC 6 Clicks Score (OT): 20  Modified [place] Scale: 2 - Slight disability.  Unable to carry out all previous activities but able to look after own affairs without assistance.    Lukasz Grider, PT  4/17/2022

## 2022-04-18 VITALS
HEART RATE: 84 BPM | RESPIRATION RATE: 16 BRPM | OXYGEN SATURATION: 95 % | HEIGHT: 67 IN | TEMPERATURE: 98 F | WEIGHT: 216 LBS | BODY MASS INDEX: 33.9 KG/M2 | DIASTOLIC BLOOD PRESSURE: 74 MMHG | SYSTOLIC BLOOD PRESSURE: 121 MMHG

## 2022-04-18 PROBLEM — K21.9 GERD WITHOUT ESOPHAGITIS: Status: RESOLVED | Noted: 2022-04-16 | Resolved: 2022-04-18

## 2022-04-18 PROBLEM — R79.89 ELEVATED TROPONIN: Status: RESOLVED | Noted: 2022-04-16 | Resolved: 2022-04-18

## 2022-04-18 PROBLEM — R47.01 EXPRESSIVE APHASIA: Status: RESOLVED | Noted: 2022-04-16 | Resolved: 2022-04-18

## 2022-04-18 PROBLEM — R77.8 ELEVATED TROPONIN: Status: RESOLVED | Noted: 2022-04-16 | Resolved: 2022-04-18

## 2022-04-18 LAB
ASCENDING AORTA: 3.7 CM
BASOPHILS # BLD AUTO: 0.04 10*3/MM3 (ref 0–0.2)
BASOPHILS NFR BLD AUTO: 0.8 % (ref 0–1.5)
BH CV ECHO MEAS - AO MAX PG (FULL): 0.96 MMHG
BH CV ECHO MEAS - AO MAX PG: 2.8 MMHG
BH CV ECHO MEAS - AO MEAN PG (FULL): 0.86 MMHG
BH CV ECHO MEAS - AO MEAN PG: 2 MMHG
BH CV ECHO MEAS - AO ROOT AREA (BSA CORRECTED): 1.8
BH CV ECHO MEAS - AO ROOT AREA: 11 CM^2
BH CV ECHO MEAS - AO ROOT DIAM: 3.7 CM
BH CV ECHO MEAS - AO V2 MAX: 83.5 CM/SEC
BH CV ECHO MEAS - AO V2 MEAN: 67.7 CM/SEC
BH CV ECHO MEAS - AO V2 VTI: 21.9 CM
BH CV ECHO MEAS - ASC AORTA: 3.7 CM
BH CV ECHO MEAS - AVA(I,A): 3.4 CM^2
BH CV ECHO MEAS - AVA(I,D): 3.4 CM^2
BH CV ECHO MEAS - AVA(V,A): 3.7 CM^2
BH CV ECHO MEAS - AVA(V,D): 3.7 CM^2
BH CV ECHO MEAS - BSA(HAYCOCK): 2.2 M^2
BH CV ECHO MEAS - BSA: 2.1 M^2
BH CV ECHO MEAS - BZI_BMI: 32.8 KILOGRAMS/M^2
BH CV ECHO MEAS - BZI_METRIC_HEIGHT: 172.7 CM
BH CV ECHO MEAS - BZI_METRIC_WEIGHT: 98 KG
BH CV ECHO MEAS - EDV(CUBED): 260.4 ML
BH CV ECHO MEAS - EDV(MOD-SP2): 115 ML
BH CV ECHO MEAS - EDV(MOD-SP4): 147 ML
BH CV ECHO MEAS - EDV(TEICH): 207.5 ML
BH CV ECHO MEAS - EF(CUBED): 54.6 %
BH CV ECHO MEAS - EF(MOD-BP): 40 %
BH CV ECHO MEAS - EF(MOD-SP2): 38.3 %
BH CV ECHO MEAS - EF(MOD-SP4): 43.5 %
BH CV ECHO MEAS - EF(TEICH): 45.5 %
BH CV ECHO MEAS - ESV(CUBED): 118.2 ML
BH CV ECHO MEAS - ESV(MOD-SP2): 71 ML
BH CV ECHO MEAS - ESV(MOD-SP4): 83 ML
BH CV ECHO MEAS - ESV(TEICH): 113.2 ML
BH CV ECHO MEAS - FS: 23.2 %
BH CV ECHO MEAS - IVS/LVPW: 0.63
BH CV ECHO MEAS - IVSD: 0.91 CM
BH CV ECHO MEAS - LA DIMENSION: 5.2 CM
BH CV ECHO MEAS - LA/AO: 1.4
BH CV ECHO MEAS - LAD MAJOR: 5.8 CM
BH CV ECHO MEAS - LAT PEAK E' VEL: 8.2 CM/SEC
BH CV ECHO MEAS - LATERAL E/E' RATIO: 8.1
BH CV ECHO MEAS - LV DIASTOLIC VOL/BSA (35-75): 69.6 ML/M^2
BH CV ECHO MEAS - LV IVRT: 0.11 SEC
BH CV ECHO MEAS - LV MASS(C)D: 340.9 GRAMS
BH CV ECHO MEAS - LV MASS(C)DI: 161.4 GRAMS/M^2
BH CV ECHO MEAS - LV MAX PG: 1.8 MMHG
BH CV ECHO MEAS - LV MEAN PG: 1.1 MMHG
BH CV ECHO MEAS - LV SYSTOLIC VOL/BSA (12-30): 39.3 ML/M^2
BH CV ECHO MEAS - LV V1 MAX: 67.6 CM/SEC
BH CV ECHO MEAS - LV V1 MEAN: 47.8 CM/SEC
BH CV ECHO MEAS - LV V1 VTI: 16 CM
BH CV ECHO MEAS - LVIDD: 6.4 CM
BH CV ECHO MEAS - LVIDS: 4.9 CM
BH CV ECHO MEAS - LVLD AP2: 8.7 CM
BH CV ECHO MEAS - LVLD AP4: 8.5 CM
BH CV ECHO MEAS - LVLS AP2: 8 CM
BH CV ECHO MEAS - LVLS AP4: 7.7 CM
BH CV ECHO MEAS - LVOT AREA (M): 4.5 CM^2
BH CV ECHO MEAS - LVOT AREA: 4.6 CM^2
BH CV ECHO MEAS - LVOT DIAM: 2.4 CM
BH CV ECHO MEAS - LVPWD: 1.5 CM
BH CV ECHO MEAS - MED PEAK E' VEL: 4.6 CM/SEC
BH CV ECHO MEAS - MEDIAL E/E' RATIO: 14.2
BH CV ECHO MEAS - MV A MAX VEL: 69.1 CM/SEC
BH CV ECHO MEAS - MV DEC SLOPE: 285.6 CM/SEC^2
BH CV ECHO MEAS - MV DEC TIME: 0.2 SEC
BH CV ECHO MEAS - MV E MAX VEL: 67.6 CM/SEC
BH CV ECHO MEAS - MV E/A: 0.98
BH CV ECHO MEAS - MV MAX PG: 2.4 MMHG
BH CV ECHO MEAS - MV MEAN PG: 1.4 MMHG
BH CV ECHO MEAS - MV P1/2T MAX VEL: 81.4 CM/SEC
BH CV ECHO MEAS - MV P1/2T: 83.5 MSEC
BH CV ECHO MEAS - MV V2 MAX: 77.4 CM/SEC
BH CV ECHO MEAS - MV V2 MEAN: 56.3 CM/SEC
BH CV ECHO MEAS - MV V2 VTI: 21.8 CM
BH CV ECHO MEAS - MVA P1/2T LCG: 2.7 CM^2
BH CV ECHO MEAS - MVA(P1/2T): 2.6 CM^2
BH CV ECHO MEAS - MVA(VTI): 3.4 CM^2
BH CV ECHO MEAS - PA ACC SLOPE: 884.7 CM/SEC^2
BH CV ECHO MEAS - PA ACC TIME: 0.08 SEC
BH CV ECHO MEAS - PA MAX PG: 2.2 MMHG
BH CV ECHO MEAS - PA PR(ACCEL): 41.8 MMHG
BH CV ECHO MEAS - PA V2 MAX: 73.6 CM/SEC
BH CV ECHO MEAS - RAP SYSTOLE: 3 MMHG
BH CV ECHO MEAS - RVSP: 26 MMHG
BH CV ECHO MEAS - SI(AO): 114.7 ML/M^2
BH CV ECHO MEAS - SI(CUBED): 67.4 ML/M^2
BH CV ECHO MEAS - SI(LVOT): 34.9 ML/M^2
BH CV ECHO MEAS - SI(MOD-SP2): 20.8 ML/M^2
BH CV ECHO MEAS - SI(MOD-SP4): 30.3 ML/M^2
BH CV ECHO MEAS - SI(TEICH): 44.7 ML/M^2
BH CV ECHO MEAS - SV(AO): 242.2 ML
BH CV ECHO MEAS - SV(CUBED): 142.3 ML
BH CV ECHO MEAS - SV(LVOT): 73.6 ML
BH CV ECHO MEAS - SV(MOD-SP2): 44 ML
BH CV ECHO MEAS - SV(MOD-SP4): 64 ML
BH CV ECHO MEAS - SV(TEICH): 94.3 ML
BH CV ECHO MEAS - TAPSE (>1.6): 1.2 CM
BH CV ECHO MEAS - TR MAX PG: 23 MMHG
BH CV ECHO MEAS - TR MAX VEL: 240 CM/SEC
BH CV ECHO MEASUREMENTS AVERAGE E/E' RATIO: 10.56
BH CV VAS BP RIGHT ARM: NORMAL MMHG
BH CV XLRA - RV BASE: 3.7 CM
BH CV XLRA - RV LENGTH: 7.2 CM
BH CV XLRA - RV MID: 2.3 CM
BH CV XLRA - TDI S': 5.7 CM/SEC
DACRYOCYTES BLD QL SMEAR: NORMAL
DEPRECATED RDW RBC AUTO: 40.7 FL (ref 37–54)
EOSINOPHIL # BLD AUTO: 0.26 10*3/MM3 (ref 0–0.4)
EOSINOPHIL NFR BLD AUTO: 5 % (ref 0.3–6.2)
ERYTHROCYTE [DISTWIDTH] IN BLOOD BY AUTOMATED COUNT: 15.9 % (ref 12.3–15.4)
GLUCOSE BLDC GLUCOMTR-MCNC: 145 MG/DL (ref 70–130)
GLUCOSE BLDC GLUCOMTR-MCNC: 159 MG/DL (ref 70–130)
HCT VFR BLD AUTO: 33.4 % (ref 37.5–51)
HGB BLD-MCNC: 10.9 G/DL (ref 13–17.7)
HYPOCHROMIA BLD QL: NORMAL
IMM GRANULOCYTES # BLD AUTO: 0.02 10*3/MM3 (ref 0–0.05)
IMM GRANULOCYTES NFR BLD AUTO: 0.4 % (ref 0–0.5)
IVRT: 114 MSEC
LEFT ATRIUM VOLUME INDEX: 37.4 ML/M^2
LEFT ATRIUM VOLUME: 79 ML
LYMPHOCYTES # BLD AUTO: 3.39 10*3/MM3 (ref 0.7–3.1)
LYMPHOCYTES NFR BLD AUTO: 64.6 % (ref 19.6–45.3)
MAGNESIUM SERPL-MCNC: 1.5 MG/DL (ref 1.6–2.4)
MCH RBC QN AUTO: 23.7 PG (ref 26.6–33)
MCHC RBC AUTO-ENTMCNC: 32.6 G/DL (ref 31.5–35.7)
MCV RBC AUTO: 72.6 FL (ref 79–97)
MICROCYTES BLD QL: NORMAL
MONOCYTES # BLD AUTO: 0.38 10*3/MM3 (ref 0.1–0.9)
MONOCYTES NFR BLD AUTO: 7.2 % (ref 5–12)
NEUTROPHILS NFR BLD AUTO: 1.16 10*3/MM3 (ref 1.7–7)
NEUTROPHILS NFR BLD AUTO: 22 % (ref 42.7–76)
NRBC BLD AUTO-RTO: 0 /100 WBC (ref 0–0.2)
PLAT MORPH BLD: NORMAL
PLATELET # BLD AUTO: 212 10*3/MM3 (ref 140–450)
PMV BLD AUTO: 10.8 FL (ref 6–12)
RBC # BLD AUTO: 4.6 10*6/MM3 (ref 4.14–5.8)
TARGETS BLD QL SMEAR: NORMAL
WBC MORPH BLD: NORMAL
WBC NRBC COR # BLD: 5.25 10*3/MM3 (ref 3.4–10.8)

## 2022-04-18 PROCEDURE — 99217 PR OBSERVATION CARE DISCHARGE MANAGEMENT: CPT | Performed by: INTERNAL MEDICINE

## 2022-04-18 PROCEDURE — 85007 BL SMEAR W/DIFF WBC COUNT: CPT | Performed by: INTERNAL MEDICINE

## 2022-04-18 PROCEDURE — G0378 HOSPITAL OBSERVATION PER HR: HCPCS

## 2022-04-18 PROCEDURE — 92523 SPEECH SOUND LANG COMPREHEN: CPT

## 2022-04-18 PROCEDURE — 99214 OFFICE O/P EST MOD 30 MIN: CPT | Performed by: PSYCHIATRY & NEUROLOGY

## 2022-04-18 PROCEDURE — 83735 ASSAY OF MAGNESIUM: CPT | Performed by: NURSE PRACTITIONER

## 2022-04-18 PROCEDURE — 63710000001 INSULIN LISPRO (HUMAN) PER 5 UNITS: Performed by: NURSE PRACTITIONER

## 2022-04-18 PROCEDURE — 85025 COMPLETE CBC W/AUTO DIFF WBC: CPT | Performed by: INTERNAL MEDICINE

## 2022-04-18 PROCEDURE — 96366 THER/PROPH/DIAG IV INF ADDON: CPT

## 2022-04-18 PROCEDURE — 97116 GAIT TRAINING THERAPY: CPT

## 2022-04-18 PROCEDURE — 25010000002 HEPARIN (PORCINE) PER 1000 UNITS: Performed by: INTERNAL MEDICINE

## 2022-04-18 PROCEDURE — 82962 GLUCOSE BLOOD TEST: CPT

## 2022-04-18 RX ADMIN — HEPARIN SODIUM 14 UNITS/KG/HR: 5000 INJECTION INTRAVENOUS; SUBCUTANEOUS at 03:39

## 2022-04-18 RX ADMIN — INSULIN LISPRO 2 UNITS: 100 INJECTION, SOLUTION INTRAVENOUS; SUBCUTANEOUS at 12:41

## 2022-04-18 RX ADMIN — OXYBUTYNIN CHLORIDE 10 MG: 10 TABLET, EXTENDED RELEASE ORAL at 09:30

## 2022-04-18 RX ADMIN — CLOPIDOGREL BISULFATE 75 MG: 75 TABLET ORAL at 09:30

## 2022-04-18 RX ADMIN — PANTOPRAZOLE SODIUM 40 MG: 40 TABLET, DELAYED RELEASE ORAL at 09:30

## 2022-04-18 RX ADMIN — SPIRONOLACTONE 50 MG: 50 TABLET ORAL at 10:02

## 2022-04-18 RX ADMIN — ASPIRIN 81 MG 81 MG: 81 TABLET ORAL at 09:30

## 2022-04-18 RX ADMIN — LEVOTHYROXINE SODIUM 75 MCG: 0.07 TABLET ORAL at 05:37

## 2022-04-18 NOTE — THERAPY DISCHARGE NOTE
Patient Name: Saulo Dahl  : 1950    MRN: [id number]                              Today's Date: 2022       Admit Date: 2022    Visit Dx:     ICD-10-CM ICD-9-CM   1. Aphasia  R47.01 784.3   2. Nonintractable headache, unspecified chronicity pattern, unspecified headache type  R51.9 784.0   3. Visual disturbance  H53.9 368.9   4. Irregular cardiac rhythm  I49.9 427.9     Patient Active Problem List   Diagnosis   • CAD (coronary artery disease)   • HTN (hypertension)   • HLD (hyperlipidemia)   • GERD without esophagitis   • T2DM (type 2 diabetes mellitus) (HCC)   • Hypothyroidism (acquired)   • Hx of migraines   • Expressive aphasia   • Elevated troponin     Past Medical History:   Diagnosis Date   • Abnormal stress test 2019    Added automatically from request for surgery [id number]   • Coronary artery disease    • Diabetes mellitus (HCC)    • Disease of thyroid gland    • GERD (gastroesophageal reflux disease)    • HLD (hyperlipidemia)    • Hypercholesteremia    • Hypertension    • Migraines      Past Surgical History:   Procedure Laterality Date   • ABDOMINAL HERNIA REPAIR     • CARDIAC CATHETERIZATION     • CARDIAC CATHETERIZATION N/A 3/8/2019    Procedure: Left Heart Cath;  Surgeon: Jamshid Smith MD;  Location: [facility] CATH INVASIVE LOCATION;  Service: Cardiology   • CATARACT EXTRACTION Right    • CIRCUMCISION     • CORONARY ARTERY BYPASS GRAFT     • TONSILLECTOMY     • TOTAL HIP ARTHROPLASTY Right       General Information     Row Name 2242          Physical Therapy Time and Intention    Document Type therapy note (daily note);discharge treatment (P)   -LK     Mode of Treatment physical therapy (P)   -LK     Row Name 22 7788          General Information    Patient Profile Reviewed yes (P)   -LK     Existing Precautions/Restrictions other (see comments) (P)   slightly impulsive  -LK     Barriers to Rehab none identified (P)   -LK     Row Name 22 9300           Cognition    Orientation Status (Cognition) oriented x 4 (P)   -     Row Name 04/18/22 0948          Safety Issues, Functional Mobility    Impairments Affecting Function (Mobility) endurance/activity tolerance;balance (P)   -LK           User Key  (r) = Recorded By, (t) = Taken By, (c) = Cosigned By    Initials Name Provider Type    Margaret Salinas, PT Student PT Student               Mobility     Row Name 04/18/22 0925          Bed Mobility    Comment, (Bed Mobility) Sitting EOB at start of session (P)   -LK     Row Name 04/18/22 0925          Sit-Stand Transfer    Sit-Stand [place] (Transfers) supervision (P)   -LK     Assistive Device (Sit-Stand Transfers) other (see comments) (P)   gait belt for safety protocol  -     Row Name 04/18/22 0925          Gait/Stairs (Locomotion)    [place] Level (Gait) supervision (P)   -LK     Distance in Feet (Gait) 360 (P)   -LK     Deviations/Abnormal Patterns (Gait) bilateral deviations;rima decreased;gait speed decreased;stride length decreased (P)   -LK     Bilateral Gait Deviations forward flexed posture (P)   -LK     [place] Level (Stairs) supervision (P)   -LK     Handrail Location (Stairs) none (P)   -LK     Number of Steps (Stairs) 10 (P)   -LK     Ascending Technique (Stairs) step-over-step (P)   -LK     Descending Technique (Stairs) step-over-step (P)   -LK     Comment, (Gait/Stairs) Pt ambulated with supervision and no AD with step through gait pattern and decreased speed. Ascended/decreased 10 steps with supervision and no AD. Pt declined walking a longer distance and returned to room with no signs of fatigue or SOB. No dizzyness or lightheadness. No LOB. (P)   -LK           User Key  (r) = Recorded By, (t) = Taken By, (c) = Cosigned By    Initials Name Provider Type    Margaret Salinas, PT Student PT Student               Obj/Interventions     Row Name 04/18/22 0925          Balance    Balance Assessment sitting static balance;sitting  dynamic balance;sit to stand dynamic balance;standing static balance;standing dynamic balance (P)   -LK     Static Sitting Balance independent (P)   -LK     Dynamic Sitting Balance independent (P)   -LK     Sit to Stand Dynamic Balance supervision (P)   -LK     Static Standing Balance supervision (P)   -LK     Dynamic Standing Balance supervision (P)   -LK     Position/Device Used, Standing Balance unsupported (P)   -LK     Balance Interventions sitting;standing;sit to stand;supported;static;dynamic (P)   -LK           User Key  (r) = Recorded By, (t) = Taken By, (c) = Cosigned By    Initials Name Provider Type    EVELIN NawafMargaret, PT Student PT Student               Goals/Plan     Row Name 04/18/22 0925          Bed Mobility Goal 1 (PT)    Activity/Assistive Device (Bed Mobility Goal 1, PT) sit to supine/supine to sit (P)   -LK     [place] Level/Cues Needed (Bed Mobility Goal 1, PT) modified independence (P)   -LK     Time Frame (Bed Mobility Goal 1, PT) long term goal (LTG);3 days (P)   -LK     Progress/Outcomes (Bed Mobility Goal 1, PT) goal met (P)   -     Row Name 04/18/22 0925          Transfer Goal 1 (PT)    Activity/Assistive Device (Transfer Goal 1, PT) sit-to-stand/stand-to-sit (P)   -LK     [place] Level/Cues Needed (Transfer Goal 1, PT) modified independence (P)   -LK     Time Frame (Transfer Goal 1, PT) long term goal (LTG);3 days (P)   -LK     Progress/Outcome (Transfer Goal 1, PT) goal met (P)   -     Row Name 04/18/22 0925          Gait Training Goal 1 (PT)    Activity/Assistive Device (Gait Training Goal 1, PT) gait (walking locomotion) (P)   -LK     [place] Level (Gait Training Goal 1, PT) modified independence (P)   -LK     Distance (Gait Training Goal 1, PT) 500 feet (P)   -LK     Time Frame (Gait Training Goal 1, PT) long term goal (LTG);3 days (P)   -LK     Progress/Outcome (Gait Training Goal 1, PT) goal partially met (P)   -     Row Name 04/18/22 0925          Stairs  Goal 1 (PT)    Activity/Assistive Device (Stairs Goal 1, PT) stairs, all skills (P)   -LK     [place] Level/Cues Needed (Stairs Goal 1, PT) modified independence (P)   -LK     Number of Stairs (Stairs Goal 1, PT) 1 (P)   -LK     Time Frame (Stairs Goal 1, PT) long term goal (LTG);3 days (P)   -LK     Progress/Outcome (Stairs Goal 1, PT) goal met (P)   -LK           User Key  (r) = Recorded By, (t) = Taken By, (c) = Cosigned By    Initials Name Provider Type    EVELIN NawafMargaret, PT Student PT Student               Clinical Impression     Row Name 04/18/22 0925          Pain    Pretreatment Pain Rating 0/10 - no pain (P)   -LK     Posttreatment Pain Rating 0/10 - no pain (P)   -LK     Row Name 04/18/22 0925          Plan of Care Review    Plan of Care Reviewed With patient (P)   -LK     Progress improving (P)   -LK     Outcome Evaluation Independent with bed mobility. Supervision with transfers, gait, and stair training. No AD required for any tasks. Ambulated 360ft and ascended/descended 10 steps. No dizzyness or lightheadedness. No LOB or other safety concerns observed. Pt appears to be functionally at baseline and no longer requires skilled PT treatment. Recommend discharge home with assistance. (P)   -LK     Row Name 04/18/22 0925          Therapy Assessment/Plan (PT)    Criteria for Skilled Interventions Met (PT) no problems identified which require skilled intervention (P)   -LK     Row Name 04/18/22 0925          Vital Signs    Pre Systolic BP Rehab 139 (P)   -LK     Pre Treatment Diastolic BP 77 (P)   -LK     Post Systolic BP Rehab 153 (P)   -LK     Post Treatment Diastolic BP 89 (P)   -LK     O2 Delivery Pre Treatment room air (P)   -LK     O2 Delivery Intra Treatment room air (P)   -LK     O2 Delivery Post Treatment room air (P)   -LK     Pre Patient Position Sitting (P)   -LK     Intra Patient Position Standing (P)   -LK     Post Patient Position Sitting (P)   -LK     Row Name 04/18/22 0917           Positioning and Restraints    Pre-Treatment Position in bed (P)   sitting EOB  -LK     Post Treatment Position chair (P)   -LK     In Chair reclined;sitting;call light within reach;encouraged to call for assist (P)   -LK           User Key  (r) = Recorded By, (t) = Taken By, (c) = Cosigned By    Initials Name Provider Type    Margaret Salinas, PT Student PT Student               Outcome Measures     Row Name 04/18/22 0925          How much help from another person do you currently need...    Turning from your back to your side while in flat bed without using bedrails? 4 (P)   -LK     Moving from lying on back to sitting on the side of a flat bed without bedrails? 4 (P)   -LK     Moving to and from a bed to a chair (including a wheelchair)? 4 (P)   -LK     Standing up from a chair using your arms (e.g., wheelchair, bedside chair)? 4 (P)   -LK     Climbing 3-5 steps with a railing? 4 (P)   -LK     To walk in hospital room? 4 (P)   -LK     AM-PAC 6 Clicks Score (PT) 24 (P)   -LK     Row Name 04/18/22 0925          Functional Assessment    Outcome Measure Options AM-PAC 6 Clicks Basic Mobility (PT) (P)   -LK           User Key  (r) = Recorded By, (t) = Taken By, (c) = Cosigned By    Initials Name Provider Type    Margaret Salinas, PT Student PT Student              Physical Therapy Education                 Title: PT OT SLP Therapies (In Progress)     Topic: Physical Therapy (Done)     Point: Mobility training (Done)     Learning Progress Summary           Patient Acceptance, E, DU by EVELIN at 4/18/2022 0925    Comment: Educated on safe sequencing for bed mobility, transfers, gait, and stairs.    Acceptance, E,D, VU by ARTHUR at 4/17/2022 0920    Comment: Educated on safe sequencing with ambulatory transfers, gait, and stair training.                   Point: Home exercise program (Done)     Learning Progress Summary           Patient Acceptance, E, DU by EVELIN at 4/18/2022 0925    Comment: Educated on safe  sequencing for bed mobility, transfers, gait, and stairs.    Acceptance, E,D, VU by ARTHUR at 4/17/2022 0920    Comment: Educated on safe sequencing with ambulatory transfers, gait, and stair training.                   Point: Body mechanics (Done)     Learning Progress Summary           Patient Acceptance, E, DU by EVELIN at 4/18/2022 0925    Comment: Educated on safe sequencing for bed mobility, transfers, gait, and stairs.    Acceptance, E,D, VU by ARTHUR at 4/17/2022 0920    Comment: Educated on safe sequencing with ambulatory transfers, gait, and stair training.                   Point: Precautions (Done)     Learning Progress Summary           Patient Acceptance, E, DU by EVELIN at 4/18/2022 0925    Comment: Educated on safe sequencing for bed mobility, transfers, gait, and stairs.    Acceptance, E,D, VU by ARTHUR at 4/17/2022 0920    Comment: Educated on safe sequencing with ambulatory transfers, gait, and stair training.                               User Key     Initials Effective Dates Name Provider Type Discipline    ARTHUR 06/16/21 -  Lukasz Grider, PT Physical Therapist PT     01/11/22 -  Margaret Mccracken PT Student PT Student PT              PT Recommendation and Plan     Plan of Care Reviewed With: (P) patient  Progress: (P) improving  Outcome Evaluation: (P) Independent with bed mobility. Supervision with transfers, gait, and stair training. No AD required for any tasks. Ambulated 360ft and ascended/descended 10 steps. No dizzyness or lightheadedness. No LOB or other safety concerns observed. Pt appears to be functionally at baseline and no longer requires skilled PT treatment. Recommend discharge home with assistance.     Time Calculation:    PT Charges     Row Name 04/18/22 1006             Time Calculation    Start Time 0925 (P)   -      PT Received On 04/18/22 (P)   -              Time Calculation- PT    Total Timed Code Minutes- PT 15 minute(s) (P)   -              Timed Charges    23801 - Gait Training Minutes   10 (P)   -LK      72643 - PT Therapeutic Activity Minutes 5 (P)   -LK              Total Minutes    Timed Charges Total Minutes 15 (P)   -LK       Total Minutes 15 (P)   -LK            User Key  (r) = Recorded By, (t) = Taken By, (c) = Cosigned By    Initials Name Provider Type    Margaret Salinas, PT Student PT Student              Therapy Charges for Today     Code Description Service Date Service Provider Modifiers Qty    42296881538 HC GAIT TRAINING EA 15 MIN 4/18/2022 Margaret Mccracken, PT Student GP 1          PT G-Codes  Outcome Measure Options: (P) AM-PAC 6 Clicks Basic Mobility (PT)  AM-PAC 6 Clicks Score (PT): (P) 24  AM-PAC 6 Clicks Score (OT): 20  Modified Allison Scale: 2 - Slight disability.  Unable to carry out all previous activities but able to look after own affairs without assistance.    PT Discharge Summary  Anticipated Discharge Disposition (PT): (P) home with assist  Reason for Discharge: (P) All goals achieved, At baseline function    MARGARET MCCRACKEN, PT Student  4/18/2022

## 2022-04-18 NOTE — PLAN OF CARE
Goal Outcome Evaluation:           Progress: improving VSS, PT is still on the heparin drip.  Pt had brp, no complaints of Pain or discomfort.  NIHSS score is a 0 at 20:00 last night.  No signs or symptoms of weakness.  Alert and oriented x4,  Will continue to monitor.

## 2022-04-18 NOTE — PLAN OF CARE
Goal Outcome Evaluation:  Plan of Care Reviewed With: patient        Progress: improving   SLP evaluation completed. Will sign-off for cog comm at this time. Pt presents with fxnl cog comm skills at this time. Please refer if status changes. Please see note for further details and recommendations.

## 2022-04-18 NOTE — THERAPY DISCHARGE NOTE
Acute Care - Speech Language Pathology Initial Evaluation/Discharge  [facility]     Patient Name: Saulo Dahl  : 1950  MRN: [id number]  Today's Date: 2022               Admit Date: 2022     Visit Dx:    ICD-10-CM ICD-9-CM   1. Aphasia  R47.01 784.3   2. Nonintractable headache, unspecified chronicity pattern, unspecified headache type  R51.9 784.0   3. Visual disturbance  H53.9 368.9   4. Irregular cardiac rhythm  I49.9 427.9     Patient Active Problem List   Diagnosis   • CAD (coronary artery disease)   • HTN (hypertension)   • HLD (hyperlipidemia)   • GERD without esophagitis   • T2DM (type 2 diabetes mellitus) (HCC)   • Hypothyroidism (acquired)   • Hx of migraines   • Expressive aphasia   • Elevated troponin     Past Medical History:   Diagnosis Date   • Abnormal stress test 2019    Added automatically from request for surgery [id number]   • Coronary artery disease    • Diabetes mellitus (HCC)    • Disease of thyroid gland    • GERD (gastroesophageal reflux disease)    • HLD (hyperlipidemia)    • Hypercholesteremia    • Hypertension    • Migraines      Past Surgical History:   Procedure Laterality Date   • ABDOMINAL HERNIA REPAIR     • CARDIAC CATHETERIZATION     • CARDIAC CATHETERIZATION N/A 3/8/2019    Procedure: Left Heart Cath;  Surgeon: Jamshid Smith MD;  Location: [facility] INVASIVE LOCATION;  Service: Cardiology   • CATARACT EXTRACTION Right    • CIRCUMCISION     • CORONARY ARTERY BYPASS GRAFT     • TONSILLECTOMY     • TOTAL HIP ARTHROPLASTY Right        SLP Recommendation and Plan  SLP Diagnosis: Fxnl cog comm skills (22)     [facility] Criteria for Skilled Therapy Interventions Met: no problems identified which require skilled intervention (22)                                     Plan of Care Reviewed With: patient (22)  Progress: improving (22)    SLP EVALUATION (last 72 hours)     SLP SLC Evaluation     Row Name 22                    Communication Assessment/Intervention    Document Type evaluation  -        Subjective Information no complaints  -HG        Patient Observations alert;cooperative;agree to therapy  -HG        Patient/Family/Caregiver Comments/Observations No family present  -HG        Patient Effort excellent  -HG        Symptoms Noted During/After Treatment none  -                  General Information    Patient Profile Reviewed yes  -HG        Pertinent History Of Current Problem Pt is a 72 year old male admitted with blurred vision, migraine and slurred speech/gibberish.  Pt with hx of CAD, CABG, stent x 1, HTN, HLD, T2DM.  -HG        Precautions/Limitations, Vision corrective lenses needed for reading  -HG        Precautions/Limitations, Hearing WFL;for purposes of eval  -HG        Patient Level of Education High School  -HG        Prior Level of Function-Communication WFL  -        Plans/Goals Discussed with patient  -        Barriers to Rehab none identified  -        Patient's Goals for Discharge return to home;take care of myself at home  -                  Pain Scale: Numbers Pre/Post-Treatment    Pretreatment Pain Rating 0/10 - no pain  -HG                  Auditory Comprehension Assessment/Intervention    Answers Questions (Communication) mulit-unit;WFL;yes/no  -HG        Able to Follow Commands (Communication) 3-step;WFL  -        Narrative Discourse simple paragraph level;WFL  -HG                  Reading Comprehension Assessment/Intervention    Reading Comprehension (Communication) WFL  -HG        Single Word Level WFL  -HG        Paragraph Level WFL  -HG                  Verbal Expression Assessment/Intervention    Verbal Expression WFL  -        Automatic Speech (Communication) response to greeting;WFL  -HG        Repetition phrases;sentences;WFL  -HG        Responsive Naming complex;WFL  -HG        Confrontational Naming low frequency;L  -HG        Sentence Formulation complex;WFL   -        Conversational Discourse/Fluency WFL  -                  Graphic Expression Assessment/Intervention    Graphic Expression WFL  -                  Oral Motor Structure and Function    Oral Motor Structure and Function WFL  -        Dentition Assessment upper dentures/partial in place;natural, present and adequate  -        Mucosal Quality moist, healthy  -                  Oral Musculature and Cranial Nerve Assessment    Oral Motor General Assessment WFL  -                  Motor Speech Assessment/Intervention    Motor Speech Function L  -                  Cursory Voice Assessment/Intervention    Quality and Resonance (Voice) WFL  -                  Cognitive Assessment Intervention- SLP    Cognitive Function (Cognition) WF  -        Orientation Status (Cognition) person;place;time;situation;WFL  -        Memory (Cognitive) immediate;long-term;WFL;delayed;mild impairment  -        Attention (Cognitive) sustained;alternating;[facility]  -        Thought Organization (Cognitive) abstract divergent;verbal sequencing;WFL;mild impairment  -HG        Reasoning (Cognitive) complex;mental flexibility;WFL;deductive;mild impairment  -HG        Problem Solving (Cognitive) simple;multifactorial;[facility]  -        Functional Math (Cognitive) simple;[facility]  -        Executive Function (Cognition) [facility]  -        Pragmatics (Communication) WFL  -                  SLP Evaluation Clinical Impressions    SLP Diagnosis Fxnl cog comm skills  -        SLC Criteria for Skilled Therapy Interventions Met no problems identified which require skilled intervention  -                  Recommendations    Therapy Frequency (SLP SLC) evaluation only  -              User Key  (r) = Recorded By, (t) = Taken By, (c) = Cosigned By    Initials Name Effective Dates    Ernestina Fraga MS CCC-SLP 06/16/21 -                    EDUCATION  The patient has been educated in the following areas:   Cognitive Impairment  Communication Impairment.                  Time Calculation:    Time Calculation- SLP     Row Name 04/18/22 0926             Time Calculation- SLP    SLP Start Time 0800  -HG      SLP Received On 04/18/22  -HG              Untimed Charges    64198-XT Eval Speech and Production w/ Language Minutes 60  -HG              Total Minutes    Untimed Charges Total Minutes 60  -HG       Total Minutes 60  -HG            User Key  (r) = Recorded By, (t) = Taken By, (c) = Cosigned By    Initials Name Provider Type     Ernestina Mahajan MS CCC-SLP Speech and Language Pathologist                Therapy Charges for Today     Code Description Service Date Service Provider Modifiers Qty    97127980702 HC ST EVAL SPEECH AND PROD W LANG  4 4/18/2022 Ernestina Mahajan MS CCC-SLP GN 1        Patient was not wearing a face mask and did not exhibit coughing during this therapy encounter.  Procedure performed was not aerosolizing, involved close contact (within 6 feet for at least 15 minutes or longer), and did not involve contact with infectious secretions or specimens.  Therapist used appropriate personal protective equipment including standard procedure mask.  Appropriate PPE was worn during the entire therapy session.  Hand hygiene was completed before and after therapy session.                   MS CHICHO Valera  4/18/2022

## 2022-04-18 NOTE — DISCHARGE SUMMARY
[facility] Medicine Services  DISCHARGE SUMMARY    Patient Name: Saulo Dahl  : 1950  MRN: [id number]    Date of Admission: 2022  5:47 PM  Date of Discharge: 2022  Primary Care Physician: Han Ramirez MD    Consults     Date and Time Order Name Status Description    2022  1:33 AM Inpatient Cardiology Consult Completed     2022 12:33 AM Inpatient Neurology Consult Stroke      2022  5:55 PM Inpatient Neurology Consult Stroke Completed           Hospital Course     Presenting Problem:   Aphasia [R47.01]    Active Hospital Problems    Diagnosis  POA   • CAD (coronary artery disease) [I25.10]  Yes   • HTN (hypertension) [I10]  Yes   • HLD (hyperlipidemia) [E78.5]  Yes   • T2DM (type 2 diabetes mellitus) (HCC) [E11.9]  Yes   • Hypothyroidism (acquired) [E03.9]  Yes   • Hx of migraines [Z86.69]  Not Applicable      Resolved Hospital Problems    Diagnosis Date Resolved POA   • **Expressive aphasia [R47.01] 2022 Yes   • GERD without esophagitis [K21.9] 2022 Yes   • Elevated troponin [R77.8] 2022 Yes          Hospital Course:  Saulo Dahl is a 72 y.o. male male w/ a hx of CAD (s/p CABG, stent x1), HTN, HLD, T2DM, hypothyroidism, GERD, migraines who presented to the ED w/ c/o slurred speech.      **Expressive aphasia; r/o CVA   **Hx of complex migraines   -Stroke neurology following. Continue ASA, statin. His p2y12 level was not suppressed so will switch to Brilenta at d/c based on stroke neurlogy recs.   -F/U neurology as outpatient.     **Elevated troponin   **Sinus arrhyhtmia   **Mildly reduced LV function w/ EF 46-50%  -His elevated troponin is likely due to above vs a fib and not indicative of ACS. He denies any chest pain. Continue medical management as above.  -Stroke neurology felt that on telemetry in ED that patient appeared to exhibit atrial fibrillation however EKG performed at that time w/ sinus arrhythmia. EP has reviewed  tele w/out evidence of a fib noted, rec'd [facility] as outpatient.  -Will undergo stress test as outpatient.    Discharge Follow Up Recommendations for outpatient labs/diagnostics:   PCP in 1-2 weeks   Neurology in 4 weeks   Dr. Smith in 4-6 weeks    Day of Discharge     See daily note    Pertinent  and/or Most Recent Results     LAB RESULTS:      Lab 04/18/22  0850 04/17/22  2200 04/17/22  1427 04/16/22 2324 04/16/22 1817 04/16/22 1812   WBC 5.25  --  4.40  --   --  5.91   HEMOGLOBIN 10.9*  --  10.0*  --   --  10.7*   HEMOGLOBIN, POC  --   --   --   --  12.9  --    HEMATOCRIT 33.4*  --  30.5*  --   --  33.9*   HEMATOCRIT POC  --   --   --   --  38  --    PLATELETS 212  --  202  --   --  190   NEUTROS ABS 1.16*  --  1.13*  --   --  1.68*   IMMATURE GRANS (ABS) 0.02  --  0.00  --   --  0.02   LYMPHS ABS 3.39*  --  2.68  --   --  3.46*   MONOS ABS 0.38  --  0.29  --   --  0.41   EOS ABS 0.26  --  0.27  --   --  0.30   MCV 72.6*  --  70.1*  --   --  72.7*   SED RATE  --   --   --  17  --   --    PROTIME  --   --  13.9  --   --   --    APTT  --   --  43.9*  --   --  30.4   HEPARIN ANTI-XA  --  0.10* 0.10*  --   --   --          Lab 04/18/22  0850 04/17/22  1427 04/16/22 1817 04/16/22 1810   SODIUM  --  139  --   --    POTASSIUM  --  3.9  --   --    CHLORIDE  --  103  --   --    CO2  --  25.0  --   --    ANION GAP  --  11.0  --   --    BUN  --  13  --   --    CREATININE  --  0.89 1.20 1.10   EGFR  --  90.5 63.9  --    GLUCOSE  --  142*  --   --    CALCIUM  --  8.8  --   --    MAGNESIUM 1.5* 1.5*  --   --    HEMOGLOBIN A1C  --  7.20*  --   --    TSH  --  0.125*  --   --          Lab 04/17/22 1427 04/16/22  1812   TOTAL PROTEIN 6.2  --    ALBUMIN 3.70  --    GLOBULIN 2.5  --    ALT (SGPT) 19 23   AST (SGOT) 26 37   BILIRUBIN 0.3  --    ALK PHOS 52  --          Lab 04/17/22 1427 04/16/22  2324 04/16/22 2120 04/16/22  1812   TROPONIN T 0.046* 0.057* 0.044* 0.039*   PROTIME 13.9  --   --   --    INR 1.08  --   --   --           Lab 04/17/22  1427   CHOLESTEROL 102   LDL CHOL 37   HDL CHOL 23*   TRIGLYCERIDES 278*             Brief Urine Lab Results  (Last result in the past 365 days)      Color   Clarity   Blood   Leuk Est   Nitrite   Protein   CREAT   Urine HCG        04/17/22 0057 Yellow   Clear   Negative   Negative   Negative   Negative               Microbiology Results (last 10 days)     Procedure Component Value - Date/Time    COVID PRE-OP / PRE-PROCEDURE SCREENING ORDER (NO ISOLATION) - Swab, Nasopharynx [[id number]]  (Normal) Collected: 04/17/22 0045    Lab Status: Final result Specimen: Swab from Nasopharynx Updated: 04/17/22 0128    Narrative:      The following orders were created for panel order COVID PRE-OP / PRE-PROCEDURE SCREENING ORDER (NO ISOLATION) - Swab, Nasopharynx.  Procedure                               Abnormality         Status                     ---------                               -----------         ------                     COVID-19 and FLU A/B PCR...[[id number]]  Normal              Final result                 Please view results for these tests on the individual orders.    COVID-19 and FLU A/B PCR - Swab, Nasopharynx [[id number]]  (Normal) Collected: 04/17/22 0045    Lab Status: Final result Specimen: Swab from Nasopharynx Updated: 04/17/22 0128     COVID19 Not Detected     Influenza A PCR Not Detected     Influenza B PCR Not Detected    Narrative:      Fact sheet for providers: https://www.fda.gov/media/770255/download    Fact sheet for patients: https://www.fda.gov/media/359305/download    Test performed by PCR.          Adult Transthoracic Echo Complete W/ Cont if Necessary Per Protocol (With Agitated Saline)    Result Date: 4/18/2022  · The left ventricular cavity is mildly dilated. · Left ventricular wall thickness is consistent with mild lateral asymmetric hypertrophy. · Left ventricular ejection fraction appears to be 46 - 50%. · Left ventricular diastolic dysfunction is noted. · Left  atrial volume is mildly increased. · Saline test results are negative for right to left atrial level shunt. · Estimated right ventricular systolic pressure from tricuspid regurgitation is normal (<35 mmHg). Calculated right ventricular systolic pressure from tricuspid regurgitation is 26 mmHg.      CT Angiogram Neck    Result Date: 4/16/2022  DATE OF EXAM: 4/16/2022 6:04 PM  PROCEDURE: CT ANGIOGRAM NECK-, CT ANGIOGRAM HEAD W AI ANALYSIS OF LVO-  INDICATIONS: Stroke, follow up  COMPARISON: CT head and CT perfusion 4/16/2022  TECHNIQUE: CTA of the head and CTA of the neck was performed after the intravenous administration of Isovue 370. Reconstructed coronal and sagittal images were also obtained. In addition, a 3-D volume rendered image was obtained after post processing. Automated exposure control and iterative reconstruction methods were used. AI analysis of LVO was utilized for the CTA Head imaging portion of the study.  The radiation dose reduction device was turned on for each scan per the ALARA (As Low as Reasonably Achievable) protocol.  Stenosis measurement was performed by the NASCET or similar method.  FINDINGS: CTA neck: Aortic arch appears within normal limits.  Origins of the great vessels are patent.  Left vertebral artery takes origin directly from the arch just proximal to the origin of the left subclavian. Portions of the brachycephalic artery right common carotid artery are obscured by beam hardening artifact from dense contrast within the right subclavian vein and superior vena cava.  Visualized portions of the right common carotid and subclavian arteries appear patent.  There is no significant plaque the right carotid bifurcation.  Right internal and external carotid arteries are patent without significant stenosis.  Left common carotid artery is patent without focal stenosis.  There is mild calcified plaque at the left carotid bifurcation resulting in less than 50% stenosis.  Remaining portions  of the left internal carotid artery are patent without focal stenosis.  Visualized left subclavian artery appears patent.  Vertebral arteries are codominant and patent to the level of the basilar without evidence of stenosis.  Unenhanced soft tissues of the neck appear within normal limits. Visualized lung apices  CTA HEAD:  Intracranial bilateral internal carotid arteries appear patent without focal stenosis.  Bilateral anterior cerebral and middle cerebral arteries appear patent without focal stenosis.  No anterior circulation aneurysm is seen.  Basilar artery is patent without focal stenosis.  Bilateral posterior cerebral arteries appear patent without focal stenosis.  Superior cerebellar arteries appear patent.  No posterior circulation aneurysm identified.  There is no pathologic intracranial contrast enhancement.  Globes and orbits appear within normal           1.  Calcified plaque the left carotid bifurcation resulting in approximately 50% stenosis of the left internal carotid artery at its origin. 2.  No hemodynamically significant stenosis of the right carotid or vertebral arteries. 3.  No intracranial vascular stenosis, occlusion, or aneurysm. 4.  No pathologic contrast enhancement.  This report was finalized on 4/16/2022 6:35 PM by Jesse Bucio MD.      MRI Brain Without Contrast    Result Date: 4/16/2022  EXAM: MRI BRAIN WO CONTRAST EXAM DATE: 4/16/2022 8:10 PM INDICATION: Transient aphasia. COMPARISON: 4/16/2022 . TECHNIQUE: Multisequence, multiangle images were obtained through the brain without the administration of intravenous contrast. CONTRAST: None. FINDINGS: TECHNICAL: Some sequences motion degraded. INTRACRANIAL CONTENTS: No intracranial mass. Empty sella. No intracranial hemorrhage. No acute infarct. Ventricles, sulci, and cisterns prominent due to mild volume loss. SOFT TISSUES: No significant abnormality.  BONES / BONE MARROW: No suspicious bone marrow signal abnormality. ORBITS: Within  normal limits. MASTOID AIR CELLS: No significant opacification. MAJOR INTRACRANIAL FLOW VOIDS: Preserved.     No acute intracranial abnormality identified. Empty sella, typically an incidental finding of no clinical significance. Less commonly, it can be associated with endocrine abnormalities and increased intracranial pressure. Electronically signed by:  Joshua Britt  4/16/2022 8:22 PM Mountain Time    XR Chest 1 View    Result Date: 4/16/2022  XR CHEST 1 VW-  Date of Exam: 4/16/2022 5:57 PM  Indication: Acute Stroke Protocol (onset < 12 hrs).  Comparison:?None available.  Technique:?A single view of the chest was obtained.  FINDINGS:  ?Patient status post median sternotomy.  Heart size and pulmonary vessels are within normal limits.  Lungs are clear bilaterally.  No pleural effusion or pneumothorax.         1. No acute cardiopulmonary disease.   This report was finalized on 4/16/2022 6:41 PM by Jesse Bucio MD.      CT Head Without Contrast Stroke Protocol    Result Date: 4/16/2022  CT HEAD WO CONTRAST STROKE PROTOCOL-  Date of Exam: 4/16/2022 5:56 PM  Indication: Neuro deficit, acute, stroke suspected.  Comparison Exams: 4/16/2015  Technique: Multiple axial images were obtained from the skull base to the vertex without the administration of IV contrast. The axial data was used to generate reformatted images in the coronal and sagittal planes. Automated exposure control and iterative reconstruction methods were used.  FINDINGS: There is no acute infarct or hemorrhage.   No abnormal extra-axial fluid collections are seen.   There is no mass effect or hydrocephalus.  There is no evidence of skull fracture.   Visualized paranasal sinuses and mastoid air cells are clear.  The globes and orbits are within normal limits.       1. No acute intracranial abnormality.  Findings personally communicated to Dannielle with stroke team at 5:58 PM on 4/16/2022  This report was finalized on 4/16/2022 6:02 PM by Jesse Bucio  MD.      CT Angiogram Head w AI Analysis of LVO    Result Date: 4/16/2022  DATE OF EXAM: 4/16/2022 6:04 PM  PROCEDURE: CT ANGIOGRAM NECK-, CT ANGIOGRAM HEAD W AI ANALYSIS OF LVO-  INDICATIONS: Stroke, follow up  COMPARISON: CT head and CT perfusion 4/16/2022  TECHNIQUE: CTA of the head and CTA of the neck was performed after the intravenous administration of Isovue 370. Reconstructed coronal and sagittal images were also obtained. In addition, a 3-D volume rendered image was obtained after post processing. Automated exposure control and iterative reconstruction methods were used. AI analysis of LVO was utilized for the CTA Head imaging portion of the study.  The radiation dose reduction device was turned on for each scan per the ALARA (As Low as Reasonably Achievable) protocol.  Stenosis measurement was performed by the NASCET or similar method.  FINDINGS: CTA neck: Aortic arch appears within normal limits.  Origins of the great vessels are patent.  Left vertebral artery takes origin directly from the arch just proximal to the origin of the left subclavian. Portions of the brachycephalic artery right common carotid artery are obscured by beam hardening artifact from dense contrast within the right subclavian vein and superior vena cava.  Visualized portions of the right common carotid and subclavian arteries appear patent.  There is no significant plaque the right carotid bifurcation.  Right internal and external carotid arteries are patent without significant stenosis.  Left common carotid artery is patent without focal stenosis.  There is mild calcified plaque at the left carotid bifurcation resulting in less than 50% stenosis.  Remaining portions of the left internal carotid artery are patent without focal stenosis.  Visualized left subclavian artery appears patent.  Vertebral arteries are codominant and patent to the level of the basilar without evidence of stenosis.  Unenhanced soft tissues of the neck appear  within normal limits. Visualized lung apices  CTA HEAD:  Intracranial bilateral internal carotid arteries appear patent without focal stenosis.  Bilateral anterior cerebral and middle cerebral arteries appear patent without focal stenosis.  No anterior circulation aneurysm is seen.  Basilar artery is patent without focal stenosis.  Bilateral posterior cerebral arteries appear patent without focal stenosis.  Superior cerebellar arteries appear patent.  No posterior circulation aneurysm identified.  There is no pathologic intracranial contrast enhancement.  Globes and orbits appear within normal           1.  Calcified plaque the left carotid bifurcation resulting in approximately 50% stenosis of the left internal carotid artery at its origin. 2.  No hemodynamically significant stenosis of the right carotid or vertebral arteries. 3.  No intracranial vascular stenosis, occlusion, or aneurysm. 4.  No pathologic contrast enhancement.  This report was finalized on 4/16/2022 6:35 PM by Jesse Bucio MD.      CT CEREBRAL PERFUSION WITH & WITHOUT CONTRAST    Result Date: 4/16/2022  CT CEREBRAL PERFUSION W WO CONTRAST-  Date of Exam: 4/16/2022 6:04 PM  Indication: Neuro deficit, acute, stroke suspected.  Comparison Exams: CT head without contrast 4/6/2022  TECHNIQUE: Perfusion imaging was performed of the brain after the administration of IV contrast.  Low-dose CT acquisition technique included one of the following options: 1. Automated exposure control, 2. Adjustment of mA and/or KV according to patient's size and/or 3. Use of iterative reconstruction.     CT PERFUSION BRAIN:  Cerebral blood flow, blood volume and mean transit time maps are symmetric without large perfusion defect.  CBF<30% volume: 0 mL Tmax>6sec volume: 0 mL Mismatch volume: 0 mL Mismatch ratio: None            1.  No perfusion abnormality seen to suggest ischemia or core infarct.  This report was finalized on 4/16/2022 6:25 PM by Jesse Bucio MD.                 Results for orders placed during the hospital encounter of 04/16/22    Adult Transthoracic Echo Complete W/ Cont if Necessary Per Protocol (With Agitated Saline)    Interpretation Summary  · The left ventricular cavity is mildly dilated.  · Left ventricular wall thickness is consistent with mild lateral asymmetric hypertrophy.  · Left ventricular ejection fraction appears to be 46 - 50%.  · Left ventricular diastolic dysfunction is noted.  · Left atrial volume is mildly increased.  · Saline test results are negative for right to left atrial level shunt.  · Estimated right ventricular systolic pressure from tricuspid regurgitation is normal (<35 mmHg). Calculated right ventricular systolic pressure from tricuspid regurgitation is 26 mmHg.      Plan for Follow-up of Pending Labs/Results:     Discharge Details        Discharge Medications      New Medications      Instructions Start Date   ticagrelor 90 MG tablet tablet  Commonly known as: Brilinta   90 mg, Oral, Every 12 Hours Scheduled         Continue These Medications      Instructions Start Date   aspirin 81 MG EC tablet   81 mg, Oral, Daily      atorvastatin 80 MG tablet  Commonly known as: LIPITOR   80 mg, Oral, Nightly      doxazosin 1 MG tablet  Commonly known as: CARDURA   1 mg, Oral, Nightly      FISH OIL PO   57 mg, Oral, Daily      gabapentin 300 MG capsule  Commonly known as: NEURONTIN   600 mg, Oral, Nightly PRN      glimepiride 2 MG tablet  Commonly known as: AMARYL   2 mg, Oral, Every Morning Before Breakfast      ibuprofen 600 MG tablet  Commonly known as: ADVIL,MOTRIN   600 mg, Oral, Every 6 Hours PRN      levothyroxine 75 MCG tablet  Commonly known as: SYNTHROID, LEVOTHROID   75 mcg, Oral, Daily      metFORMIN 500 MG tablet  Commonly known as: GLUCOPHAGE   500 mg, Oral, 2 Times Daily With Meals      omeprazole 20 MG capsule  Commonly known as: priLOSEC   20 mg, Oral, Daily      oxybutynin XL 10 MG 24 hr tablet  Commonly known as:  DITROPAN-XL   10 mg, Oral, Daily      spironolactone 50 MG tablet  Commonly known as: ALDACTONE   50 mg, Oral, Daily      VITAMIN D PO   1 capsule, Oral, Daily         Stop These Medications    clopidogrel 75 MG tablet  Commonly known as: PLAVIX            Allergies   Allergen Reactions   • Lactose Intolerance (Gi) GI Intolerance         Discharge Disposition:  Home or Self Care    Diet:  Hospital:  Diet Order   Procedures   • Diet Regular; Cardiac, Consistent Carbohydrate         CODE STATUS:    Code Status and Medical Interventions:   Ordered at: 04/16/22 2059     Code Status (Patient has no pulse and is not breathing):    CPR (Attempt to Resuscitate)     Medical Interventions (Patient has pulse or is breathing):    Full Support       No future appointments.    Additional Instructions for the Follow-ups that You Need to Schedule     Discharge Follow-up with Specialty: Dr. Smith; 1 Month   As directed      Specialty: Dr. Smith    Follow Up: 1 Month         Discharge Follow-up with Specialty: Neurology; 1 Month   As directed      Specialty: Neurology    Follow Up: 1 Month                     Jessica Arreoal II, DO  04/18/22      Time Spent on Discharge:  I spent  34  minutes on this discharge activity which included: face-to-face encounter with the patient, reviewing the data in the system, coordination of the care with the nursing staff as well as consultants, documentation, and entering orders.         -c/w Regulo

## 2022-04-18 NOTE — CONSULTS
" Discussed and taught patient about type 2 diabetes self-management, risk factors, and importance of blood glucose control to reduce complications. Target blood glucose readings and A1c goals per ADA were reviewed. Reviewed with patient current A1c 7.2 and discussed its significance. Signs, symptoms, and treatment of hyperglycemia and hypoglycemia were discussed. Lifestyle changes such as physical activity with MD approval and healthy eating were encouraged. Stressed the importance of strict blood sugar control after surgery to prevent complications such as infection and to promote healing of incision. Encouraged pt to monitor blood sugar at home 2+  times per day and to call PCP if blood sugar is trending randy. Encouraged to keep record of blood glucose readings to take to follow up appointment with PCP.   Not a candidate for the outpatient stroke/ diabetes class as \" MRI negative for stroke\" Thank you for this referral.  "

## 2022-04-18 NOTE — PLAN OF CARE
Goal Outcome Evaluation:  Plan of Care Reviewed With: (P) patient        Progress: (P) improving  Outcome Evaluation: (P) Independent with bed mobility. Supervision with transfers, gait, and stair training. No AD required for any tasks. Ambulated 360ft and ascended/descended 10 steps. No dizzyness or lightheadedness. No LOB or other safety concerns observed. Pt appears to be functionally at baseline and no longer requires skilled PT treatment. Recommend discharge home with assistance.

## 2022-04-18 NOTE — PLAN OF CARE
Problem: Adjustment to Illness (Stroke, Ischemic/Transient Ischemic Attack)  Goal: Optimal Coping  Outcome: Ongoing, Progressing     Problem: Bowel Elimination Impaired (Stroke, Ischemic/Transient Ischemic Attack)  Goal: Effective Bowel Elimination  Outcome: Ongoing, Progressing     Problem: Cerebral Tissue Perfusion (Stroke, Ischemic/Transient Ischemic Attack)  Goal: Optimal Cerebral Tissue Perfusion  Outcome: Ongoing, Progressing     Problem: Cognitive Impairment (Stroke, Ischemic/Transient Ischemic Attack)  Goal: Optimal Cognitive Function  Outcome: Ongoing, Progressing     Problem: Communication Impairment (Stroke, Ischemic/Transient Ischemic Attack)  Goal: Improved Communication Skills  Outcome: Ongoing, Progressing     Problem: Functional Ability Impaired (Stroke, Ischemic/Transient Ischemic Attack)  Goal: Optimal Functional Ability  Outcome: Ongoing, Progressing  Intervention: Optimize Functional Ability  Recent Flowsheet Documentation  Taken 4/18/2022 1200 by Ana Laura Moralez RN  Activity Management:   activity adjusted per tolerance   activity encouraged   up in chair  Taken 4/18/2022 1000 by Ana Laura Moralez RN  Activity Management:   activity adjusted per tolerance   activity encouraged   up in chair  Taken 4/18/2022 0800 by Ana Laura Moralez RN  Activity Management:   activity adjusted per tolerance   activity encouraged     Problem: Respiratory Compromise (Stroke, Ischemic/Transient Ischemic Attack)  Goal: Effective Oxygenation and Ventilation  Outcome: Ongoing, Progressing  Intervention: Optimize Oxygenation and Ventilation  Recent Flowsheet Documentation  Taken 4/18/2022 0800 by Ana Laura Moralez RN  Head of Bed (HOB) Positioning: HOB at 30-45 degrees     Problem: Sensorimotor Impairment (Stroke, Ischemic/Transient Ischemic Attack)  Goal: Improved Sensorimotor Function  Outcome: Ongoing, Progressing  Intervention: Optimize Range of Motion, Motor Control and Function  Recent Flowsheet  Documentation  Taken 4/18/2022 1200 by Ana Laura Moralez RN  Positioning/Transfer Devices:   pillows   in use  Taken 4/18/2022 1000 by Ana Laura Moralez RN  Positioning/Transfer Devices:   pillows   in use  Taken 4/18/2022 0800 by Ana Laura Moralez RN  Positioning/Transfer Devices:   pillows   in use     Problem: Swallowing Impairment (Stroke, Ischemic/Transient Ischemic Attack)  Goal: Optimal Eating and Swallowing without Aspiration  Outcome: Ongoing, Progressing     Problem: Urinary Elimination Impaired (Stroke, Ischemic/Transient Ischemic Attack)  Goal: Effective Urinary Elimination  Outcome: Ongoing, Progressing     Problem: Adult Inpatient Plan of Care  Goal: Absence of Hospital-Acquired Illness or Injury  Outcome: Ongoing, Progressing  Intervention: Identify and Manage Fall Risk  Recent Flowsheet Documentation  Taken 4/18/2022 1200 by Ana Laura Moralez RN  Safety Promotion/Fall Prevention:   activity supervised   assistive device/personal items within reach   clutter free environment maintained   room organization consistent   safety round/check completed   toileting scheduled  Taken 4/18/2022 1000 by Ana Laura Moralez RN  Safety Promotion/Fall Prevention:   activity supervised   assistive device/personal items within reach   clutter free environment maintained   room organization consistent   safety round/check completed   toileting scheduled  Taken 4/18/2022 0800 by Ana Laura Moralez RN  Safety Promotion/Fall Prevention:   activity supervised   assistive device/personal items within reach   room organization consistent   safety round/check completed   toileting scheduled  Intervention: Prevent Skin Injury  Recent Flowsheet Documentation  Taken 4/18/2022 1200 by Ana Laura Moralez RN  Body Position: position changed independently  Taken 4/18/2022 1000 by Ana Laura Moralez RN  Body Position: position changed independently  Taken 4/18/2022 0800 by Ana Laura Moralez RN  Body Position: position changed  independently  Intervention: Prevent and Manage VTE (Venous Thromboembolism) Risk  Recent Flowsheet Documentation  Taken 4/18/2022 1200 by Ana Laura Moralez RN  Activity Management:   activity adjusted per tolerance   activity encouraged   up in chair  VTE Prevention/Management: patient refused intervention  Taken 4/18/2022 1000 by Ana Laura Moralez RN  Activity Management:   activity adjusted per tolerance   activity encouraged   up in chair  VTE Prevention/Management: patient refused intervention  Taken 4/18/2022 0800 by Ana Laura Moralez RN  Activity Management:   activity adjusted per tolerance   activity encouraged  VTE Prevention/Management: patient refused intervention  Intervention: Prevent Infection  Recent Flowsheet Documentation  Taken 4/18/2022 1200 by Ana Laura Moralez RN  Infection Prevention:   environmental surveillance performed   rest/sleep promoted  Taken 4/18/2022 0800 by Ana Laura Moralez RN  Infection Prevention:   environmental surveillance performed   rest/sleep promoted  Goal: Optimal Comfort and Wellbeing  Outcome: Ongoing, Progressing  Intervention: Provide Person-Centered Care  Recent Flowsheet Documentation  Taken 4/18/2022 1200 by Ana Laura Moralez RN  Trust Relationship/Rapport: care explained  Taken 4/18/2022 0800 by Ana Laura Moralez RN  Trust Relationship/Rapport: care explained  Goal: Readiness for Transition of Care  Outcome: Ongoing, Progressing   Goal Outcome Evaluation:

## 2022-04-18 NOTE — PROGRESS NOTES
"Neurology       Patient Care Team:  Han Ramirez MD as PCP - General  Han Ramirez MD as PCP - Family Medicine    Chief complaint TIA, aphasia      Subjective .     History:   Doing well today, feels tired but at baseline    ROS: Denies headache    Objective     Vital Signs   Blood pressure 121/74, pulse 84, temperature 98 °F (36.7 °C), temperature source Oral, resp. rate 16, height 170 cm (66.93\"), weight 98 kg (216 lb), SpO2 95 %.    Physical Exam:  Adult -American man.  He is initially sleeping but woke easily.  He is oriented to person place and time, attends to me well and follows commands.  He can repeat well.  Naming intact.  Speech is clear.  EOMI, visual fields intact to threat.  He is moving all extremities equally.  On room air, even respirations.    Results Review:  A.m. labs reviewed    2D echo  · The left ventricular cavity is mildly dilated.  · Left ventricular wall thickness is consistent with mild lateral asymmetric hypertrophy.  · Left ventricular ejection fraction appears to be 46 - 50%.  · Left ventricular diastolic dysfunction is noted.  · Left atrial volume is mildly increased.  · Saline test results are negative for right to left atrial level shunt.  · Estimated right ventricular systolic pressure from tricuspid regurgitation is normal (<35 mmHg). Calculated right ventricular systolic pressure from tricuspid regurgitation is 26 mmHg.         Assessment/Plan     72-year-old -American man who has a history of hypertension, hyperlipidemia, coronary artery disease status post CABG, diabetes, migraines, who is on dual antiplatelet therapy at home.  Patient had an episode of transient aphasia concerning for TIA.     Patient does not a Plavix responder.  We will discontinue this medication.     Cardiology reviewed his telemetry since admission, they did not note any evidence of atrial fibrillation.  We will transition from Plavix to Brilinta.  He will get a long-term heart " monitor placed as an outpatient.    1.  TIA  -MRI negative for stroke  -Not a Plavix responder, discontinue Plavix  -Brilinta 90 mg twice daily  -2D echo, no PFO  -Follow in our clinic as an outpatient     2.  Essential hypertension  -Normotensive goal      3. Diabetes  -A1c 8.4, diabetes management     4.  Hyperlipidemia  -LDL 37  -Continue home dose atorvastatin     5.    ? A. fib   -MRI brain is negative for acute stroke, believe he had a TIA  -Cardiology reviewed telemetry, no atrial fibrillation noted  -Loop recorder as an outpatient    Had a long discussion with patient and wife.  Still concern for paroxysmal A. fib.  A loop recorder will be placed as discharge.  We discussed signs and symptoms of stroke and he will call 911 if they occur.    I discussed the patients findings and my recommendations with patient, patient's wife, primary team    Okay to DC home today from my perspective    Reshma Quezada MD  04/18/22  13:50 EDT

## 2022-04-18 NOTE — CASE MANAGEMENT/SOCIAL WORK
Continued Stay Note  [facility]     Patient Name: Saulo Dahl  MRN: [id number]  Today's Date: 4/18/2022    Admit Date: 4/16/2022     Discharge Plan     Row Name 04/18/22 0913       Plan    Plan home    Patient/Family in Agreement with Plan yes    Plan Comments Pt lives in [facility] with his wife. He reports he is independent with ADLs and denies use of any DME. He has a PCP and has drug coverage. His plan for discharge is to return home and he denies any discharge needs. Cm to follow.    Final Discharge Disposition Code 01 - home or self-care               Discharge Codes    No documentation.               Expected Discharge Date and Time     Expected Discharge Date Expected Discharge Time    Apr 19, 2022             Chiquita Crum RN

## 2022-04-18 NOTE — PROGRESS NOTES
" [facility] - Progress Note    Saulo Dahl  1950  [id number]/1    04/18/22, 11:11 EDT      Chief Complaint: Following for CAD    Subjective:   Remains chest pain free.  Vitals stable.    Review of Systems:  Pertinent positives are listed above and in physical exam.  All others have been reviewed and are negative.    aspirin, 81 mg, Oral, Daily   Or  aspirin, 300 mg, Rectal, Daily  atorvastatin, 80 mg, Oral, Nightly  clopidogrel, 75 mg, Oral, Daily  insulin lispro, 0-7 Units, Subcutaneous, 4x Daily With Meals & Nightly  levothyroxine, 75 mcg, Oral, Q AM  oxybutynin XL, 10 mg, Oral, Daily  pantoprazole, 40 mg, Oral, QAM  sodium chloride, 10 mL, Intravenous, Q12H  sodium chloride, 10 mL, Intravenous, Q12H  spironolactone, 50 mg, Oral, Daily  terazosin, 1 mg, Oral, Nightly        Objective:  Vitals:   height is 170 cm (66.93\") and weight is 98 kg (216 lb). His oral temperature is 97.8 °F (36.6 °C). His blood pressure is 143/79 and his pulse is 84. His respiration is 16 and oxygen saturation is 95%.       Intake/Output Summary (Last 24 hours) at 4/18/2022 1111  Last data filed at 4/18/2022 0900  Gross per 24 hour   Intake 445.35 ml   Output --   Net 445.35 ml       Physical Exam:  General:  WN, NAD, A and O x3.  CV:  Normal S1,S2. No murmur, rub, or gallop.  Resp:  CTA Snadip, equal, nonlabored.  Abd:  Soft, + BS, no organomegaly. Nontender to palpation.  Extrem:  No edema BLE, 2+ pedal/PT pulses.            Results from last 7 days   Lab Units 04/18/22  0850   WBC 10*3/mm3 5.25   HEMOGLOBIN g/dL 10.9*   HEMATOCRIT % 33.4*   PLATELETS 10*3/mm3 212     Results from last 7 days   Lab Units 04/17/22  1427   SODIUM mmol/L 139   POTASSIUM mmol/L 3.9   CHLORIDE mmol/L 103   CO2 mmol/L 25.0   BUN mg/dL 13   CREATININE mg/dL 0.89   CALCIUM mg/dL 8.8   BILIRUBIN mg/dL 0.3   ALK PHOS U/L 52   ALT (SGPT) U/L 19   AST (SGOT) U/L 26   GLUCOSE mg/dL 142*     Results from last 7 days   Lab Units 04/17/22  1427   INR  " 1.08     Results from last 7 days   Lab Units 04/17/22  1427   TSH uIU/mL 0.125*     Results from last 7 days   Lab Units 04/17/22  1427   CHOLESTEROL mg/dL 102   TRIGLYCERIDES mg/dL 278*   HDL CHOL mg/dL 23*   LDL CHOL mg/dL 37           Tele:  NSR    Assessment and Plan:  1.  Elevated troponin              -No current anginal symptoms.  Likely type II mechanism              -Will continue medical management for now.  Will schedule pt for outpatient Lexiscan Cardiolite in our office with follow up with Dr. Smith in 6 weeks with EKG.   -  Okay for DC home from cardiology standpoint.  Recommend DC Home on the following cardiac meds:  ASA 81mg daily,  Plavix 75mg daily, Lipitor 80mg QHS.  Hold off on beta blocker for now with recent bradycardia.  Will place monitor as outpatient.     2.  CAD              -No recent anginal symptoms              -Continue ASA, Plavix, Statin.         -  Lipid panel reviewed.     3.  Hypertension              -Restart home antihypertensives              -Not a good candidate for beta-blockade due to baseline low heart rate.  Will re evaluate after monitor is worn.     4.  Acute migraine headache              -Per hospitalist and neurology     5.  Sinus arrhythmia, rare PACs and PVCs.              -no evidence of atrial fibrillation.     -  Will place an MCOT as outpatient.  Our office is aware and will notify patient.    I discussed the patient's findings and my recommendations with the patient, any present family members, and the nursing staff.  Jamshid Smith MD saw and examined patient, verified hx and PE, read all radiographic studies, reviewed labs and micro data, and formulated dx, plan for treatment and all medical decision making.      Sylvia Smith PA-C  04/18/22, 11:11 EDT

## 2022-04-18 NOTE — PROGRESS NOTES
[facility]  PROGRESS NOTE    Patient Name: Saulo Dahl  : 1950  MRN: [id number]    Date of Admission: 2022  Primary Care Physician: Han Ramirez MD    Subjective   Subjective     CC: f/u TIA    HPI: Lying in bed. Had a good night. Says he feels back to normal. No CP. Wants to go home.    ROS:  Gen- No fevers, chills  CV- No chest pain, palpitations  Resp- No cough, dyspnea  GI- No N/V/D, abd pain     Objective   Objective     Vital Signs:   Temp:  [97.8 °F (36.6 °C)-98.5 °F (36.9 °C)] 97.8 °F (36.6 °C)  Heart Rate:  [] 84  Resp:  [16-18] 16  BP: (123-155)/(62-99) 143/79     Physical Exam:  Constitutional: No acute distress, awake, alert  HENT: NCAT, mucous membranes moist  Respiratory: Clear to auscultation bilaterally, respiratory effort normal   Cardiovascular: RRR, no murmurs, rubs, or gallops  Gastrointestinal: Positive bowel sounds, soft, nontender, nondistended  Musculoskeletal: No bilateral ankle edema  Psychiatric: Appropriate affect, cooperative  Neurologic: Oriented x 3, strength symmetric in all extremities, Cranial Nerves grossly intact to confrontation, speech clear  Skin: No rashes    Results Reviewed:  LAB RESULTS:      Lab 22  0850 22  2200 22  1427 22  2324 22  1817 22  1812   WBC 5.25  --  4.40  --   --  5.91   HEMOGLOBIN 10.9*  --  10.0*  --   --  10.7*   HEMOGLOBIN, POC  --   --   --   --  12.9  --    HEMATOCRIT 33.4*  --  30.5*  --   --  33.9*   HEMATOCRIT POC  --   --   --   --  38  --    PLATELETS 212  --  202  --   --  190   NEUTROS ABS  --   --  1.13*  --   --  1.68*   IMMATURE GRANS (ABS)  --   --  0.00  --   --  0.02   LYMPHS ABS  --   --  2.68  --   --  3.46*   MONOS ABS  --   --  0.29  --   --  0.41   EOS ABS  --   --  0.27  --   --  0.30   MCV 72.6*  --  70.1*  --   --  72.7*   SED RATE  --   --   --  17  --   --    PROTIME  --   --  13.9  --   --   --    APTT  --   --  43.9*   --   --  30.4   HEPARIN ANTI-XA  --  0.10* 0.10*  --   --   --          Lab 04/17/22  1427 04/16/22  1817 04/16/22  1810   SODIUM 139  --   --    POTASSIUM 3.9  --   --    CHLORIDE 103  --   --    CO2 25.0  --   --    ANION GAP 11.0  --   --    BUN 13  --   --    CREATININE 0.89 1.20 1.10   EGFR 90.5 63.9  --    GLUCOSE 142*  --   --    CALCIUM 8.8  --   --    MAGNESIUM 1.5*  --   --    HEMOGLOBIN A1C 7.20*  --   --    TSH 0.125*  --   --          Lab 04/17/22  1427 04/16/22  1812   TOTAL PROTEIN 6.2  --    ALBUMIN 3.70  --    GLOBULIN 2.5  --    ALT (SGPT) 19 23   AST (SGOT) 26 37   BILIRUBIN 0.3  --    ALK PHOS 52  --          Lab 04/17/22 1427 04/16/22  2324 04/16/22 2120 04/16/22  1812   TROPONIN T 0.046* 0.057* 0.044* 0.039*   PROTIME 13.9  --   --   --    INR 1.08  --   --   --          Lab 04/17/22  1427   CHOLESTEROL 102   LDL CHOL 37   HDL CHOL 23*   TRIGLYCERIDES 278*             Brief Urine Lab Results  (Last result in the past 365 days)      Color   Clarity   Blood   Leuk Est   Nitrite   Protein   CREAT   Urine HCG        04/17/22 0057 Yellow   Clear   Negative   Negative   Negative   Negative                 Microbiology Results Abnormal     Procedure Component Value - Date/Time    COVID PRE-OP / PRE-PROCEDURE SCREENING ORDER (NO ISOLATION) - Swab, Nasopharynx [[id number]]  (Normal) Collected: 04/17/22 0045    Lab Status: Final result Specimen: Swab from Nasopharynx Updated: 04/17/22 0128    Narrative:      The following orders were created for panel order COVID PRE-OP / PRE-PROCEDURE SCREENING ORDER (NO ISOLATION) - Swab, Nasopharynx.  Procedure                               Abnormality         Status                     ---------                               -----------         ------                     COVID-19 and FLU A/B PCR...[[id number]]  Normal              Final result                 Please view results for these tests on the individual orders.    COVID-19 and FLU A/B PCR - Swab, Nasopharynx  [[id number]]  (Normal) Collected: 04/17/22 0045    Lab Status: Final result Specimen: Swab from Nasopharynx Updated: 04/17/22 0128     COVID19 Not Detected     Influenza A PCR Not Detected     Influenza B PCR Not Detected    Narrative:      Fact sheet for providers: https://www.fda.gov/media/201312/download    Fact sheet for patients: https://www.fda.gov/media/297005/download    Test performed by PCR.          Adult Transthoracic Echo Complete W/ Cont if Necessary Per Protocol (With Agitated Saline)    Result Date: 4/18/2022  · The left ventricular cavity is mildly dilated. · Left ventricular wall thickness is consistent with mild lateral asymmetric hypertrophy. · Left ventricular ejection fraction appears to be 46 - 50%. · Left ventricular diastolic dysfunction is noted. · Left atrial volume is mildly increased. · Saline test results are negative for right to left atrial level shunt. · Estimated right ventricular systolic pressure from tricuspid regurgitation is normal (<35 mmHg). Calculated right ventricular systolic pressure from tricuspid regurgitation is 26 mmHg.      CT Angiogram Neck    Result Date: 4/16/2022  DATE OF EXAM: 4/16/2022 6:04 PM  PROCEDURE: CT ANGIOGRAM NECK-, CT ANGIOGRAM HEAD W AI ANALYSIS OF LVO-  INDICATIONS: Stroke, follow up  COMPARISON: CT head and CT perfusion 4/16/2022  TECHNIQUE: CTA of the head and CTA of the neck was performed after the intravenous administration of Isovue 370. Reconstructed coronal and sagittal images were also obtained. In addition, a 3-D volume rendered image was obtained after post processing. Automated exposure control and iterative reconstruction methods were used. AI analysis of LVO was utilized for the CTA Head imaging portion of the study.  The radiation dose reduction device was turned on for each scan per the ALARA (As Low as Reasonably Achievable) protocol.  Stenosis measurement was performed by the NASCET or similar method.  FINDINGS: CTA neck: Aortic arch  appears within normal limits.  Origins of the great vessels are patent.  Left vertebral artery takes origin directly from the arch just proximal to the origin of the left subclavian. Portions of the brachycephalic artery right common carotid artery are obscured by beam hardening artifact from dense contrast within the right subclavian vein and superior vena cava.  Visualized portions of the right common carotid and subclavian arteries appear patent.  There is no significant plaque the right carotid bifurcation.  Right internal and external carotid arteries are patent without significant stenosis.  Left common carotid artery is patent without focal stenosis.  There is mild calcified plaque at the left carotid bifurcation resulting in less than 50% stenosis.  Remaining portions of the left internal carotid artery are patent without focal stenosis.  Visualized left subclavian artery appears patent.  Vertebral arteries are codominant and patent to the level of the basilar without evidence of stenosis.  Unenhanced soft tissues of the neck appear within normal limits. Visualized lung apices  CTA HEAD:  Intracranial bilateral internal carotid arteries appear patent without focal stenosis.  Bilateral anterior cerebral and middle cerebral arteries appear patent without focal stenosis.  No anterior circulation aneurysm is seen.  Basilar artery is patent without focal stenosis.  Bilateral posterior cerebral arteries appear patent without focal stenosis.  Superior cerebellar arteries appear patent.  No posterior circulation aneurysm identified.  There is no pathologic intracranial contrast enhancement.  Globes and orbits appear within normal          Impression:  1.  Calcified plaque the left carotid bifurcation resulting in approximately 50% stenosis of the left internal carotid artery at its origin. 2.  No hemodynamically significant stenosis of the right carotid or vertebral arteries. 3.  No intracranial vascular stenosis,  occlusion, or aneurysm. 4.  No pathologic contrast enhancement.  This report was finalized on 4/16/2022 6:35 PM by Jesse Bucio MD.      MRI Brain Without Contrast    Result Date: 4/16/2022  EXAM: MRI BRAIN WO CONTRAST EXAM DATE: 4/16/2022 8:10 PM INDICATION: Transient aphasia. COMPARISON: 4/16/2022 . TECHNIQUE: Multisequence, multiangle images were obtained through the brain without the administration of intravenous contrast. CONTRAST: None. FINDINGS: TECHNICAL: Some sequences motion degraded. INTRACRANIAL CONTENTS: No intracranial mass. Empty sella. No intracranial hemorrhage. No acute infarct. Ventricles, sulci, and cisterns prominent due to mild volume loss. SOFT TISSUES: No significant abnormality.  BONES / BONE MARROW: No suspicious bone marrow signal abnormality. ORBITS: Within normal limits. MASTOID AIR CELLS: No significant opacification. MAJOR INTRACRANIAL FLOW VOIDS: Preserved.     Impression: No acute intracranial abnormality identified. Empty sella, typically an incidental finding of no clinical significance. Less commonly, it can be associated with endocrine abnormalities and increased intracranial pressure. Electronically signed by:  Joshua Britt  4/16/2022 8:22 PM Mountain Time    XR Chest 1 View    Result Date: 4/16/2022  XR CHEST 1 VW-  Date of Exam: 4/16/2022 5:57 PM  Indication: Acute Stroke Protocol (onset < 12 hrs).  Comparison:?None available.  Technique:?A single view of the chest was obtained.  FINDINGS:  ?Patient status post median sternotomy.  Heart size and pulmonary vessels are within normal limits.  Lungs are clear bilaterally.  No pleural effusion or pneumothorax.        Impression:  1. No acute cardiopulmonary disease.   This report was finalized on 4/16/2022 6:41 PM by Jesse Bucio MD.      CT Head Without Contrast Stroke Protocol    Result Date: 4/16/2022  CT HEAD WO CONTRAST STROKE PROTOCOL-  Date of Exam: 4/16/2022 5:56 PM  Indication: Neuro deficit, acute, stroke  suspected.  Comparison Exams: 4/16/2015  Technique: Multiple axial images were obtained from the skull base to the vertex without the administration of IV contrast. The axial data was used to generate reformatted images in the coronal and sagittal planes. Automated exposure control and iterative reconstruction methods were used.  FINDINGS: There is no acute infarct or hemorrhage.   No abnormal extra-axial fluid collections are seen.   There is no mass effect or hydrocephalus.  There is no evidence of skull fracture.   Visualized paranasal sinuses and mastoid air cells are clear.  The globes and orbits are within normal limits.      Impression:  1. No acute intracranial abnormality.  Findings personally communicated to Dannielle with stroke team at 5:58 PM on 4/16/2022  This report was finalized on 4/16/2022 6:02 PM by Jesse Bucio MD.      CT Angiogram Head w AI Analysis of LVO    Result Date: 4/16/2022  DATE OF EXAM: 4/16/2022 6:04 PM  PROCEDURE: CT ANGIOGRAM NECK-, CT ANGIOGRAM HEAD W AI ANALYSIS OF LVO-  INDICATIONS: Stroke, follow up  COMPARISON: CT head and CT perfusion 4/16/2022  TECHNIQUE: CTA of the head and CTA of the neck was performed after the intravenous administration of Isovue 370. Reconstructed coronal and sagittal images were also obtained. In addition, a 3-D volume rendered image was obtained after post processing. Automated exposure control and iterative reconstruction methods were used. AI analysis of LVO was utilized for the CTA Head imaging portion of the study.  The radiation dose reduction device was turned on for each scan per the ALARA (As Low as Reasonably Achievable) protocol.  Stenosis measurement was performed by the NASCET or similar method.  FINDINGS: CTA neck: Aortic arch appears within normal limits.  Origins of the great vessels are patent.  Left vertebral artery takes origin directly from the arch just proximal to the origin of the left subclavian. Portions of the brachycephalic  artery right common carotid artery are obscured by beam hardening artifact from dense contrast within the right subclavian vein and superior vena cava.  Visualized portions of the right common carotid and subclavian arteries appear patent.  There is no significant plaque the right carotid bifurcation.  Right internal and external carotid arteries are patent without significant stenosis.  Left common carotid artery is patent without focal stenosis.  There is mild calcified plaque at the left carotid bifurcation resulting in less than 50% stenosis.  Remaining portions of the left internal carotid artery are patent without focal stenosis.  Visualized left subclavian artery appears patent.  Vertebral arteries are codominant and patent to the level of the basilar without evidence of stenosis.  Unenhanced soft tissues of the neck appear within normal limits. Visualized lung apices  CTA HEAD:  Intracranial bilateral internal carotid arteries appear patent without focal stenosis.  Bilateral anterior cerebral and middle cerebral arteries appear patent without focal stenosis.  No anterior circulation aneurysm is seen.  Basilar artery is patent without focal stenosis.  Bilateral posterior cerebral arteries appear patent without focal stenosis.  Superior cerebellar arteries appear patent.  No posterior circulation aneurysm identified.  There is no pathologic intracranial contrast enhancement.  Globes and orbits appear within normal          Impression:  1.  Calcified plaque the left carotid bifurcation resulting in approximately 50% stenosis of the left internal carotid artery at its origin. 2.  No hemodynamically significant stenosis of the right carotid or vertebral arteries. 3.  No intracranial vascular stenosis, occlusion, or aneurysm. 4.  No pathologic contrast enhancement.  This report was finalized on 4/16/2022 6:35 PM by Jesse Bucio MD.      CT CEREBRAL PERFUSION WITH & WITHOUT CONTRAST    Result Date: 4/16/2022  CT  CEREBRAL PERFUSION W WO CONTRAST-  Date of Exam: 4/16/2022 6:04 PM  Indication: Neuro deficit, acute, stroke suspected.  Comparison Exams: CT head without contrast 4/6/2022  TECHNIQUE: Perfusion imaging was performed of the brain after the administration of IV contrast.  Low-dose CT acquisition technique included one of the following options: 1. Automated exposure control, 2. Adjustment of mA and/or KV according to patient's size and/or 3. Use of iterative reconstruction.     CT PERFUSION BRAIN:  Cerebral blood flow, blood volume and mean transit time maps are symmetric without large perfusion defect.  CBF<30% volume: 0 mL Tmax>6sec volume: 0 mL Mismatch volume: 0 mL Mismatch ratio: None          Impression:   1.  No perfusion abnormality seen to suggest ischemia or core infarct.  This report was finalized on 4/16/2022 6:25 PM by Jesse Bucio MD.        Results for orders placed during the hospital encounter of 04/16/22    Adult Transthoracic Echo Complete W/ Cont if Necessary Per Protocol (With Agitated Saline)    Interpretation Summary  · The left ventricular cavity is mildly dilated.  · Left ventricular wall thickness is consistent with mild lateral asymmetric hypertrophy.  · Left ventricular ejection fraction appears to be 46 - 50%.  · Left ventricular diastolic dysfunction is noted.  · Left atrial volume is mildly increased.  · Saline test results are negative for right to left atrial level shunt.  · Estimated right ventricular systolic pressure from tricuspid regurgitation is normal (<35 mmHg). Calculated right ventricular systolic pressure from tricuspid regurgitation is 26 mmHg.      I have reviewed the medications:  Scheduled Meds:aspirin, 81 mg, Oral, Daily   Or  aspirin, 300 mg, Rectal, Daily  atorvastatin, 80 mg, Oral, Nightly  clopidogrel, 75 mg, Oral, Daily  insulin lispro, 0-7 Units, Subcutaneous, 4x Daily With Meals & Nightly  levothyroxine, 75 mcg, Oral, Q AM  oxybutynin XL, 10 mg, Oral,  Daily  pantoprazole, 40 mg, Oral, QAM  sodium chloride, 10 mL, Intravenous, Q12H  sodium chloride, 10 mL, Intravenous, Q12H  spironolactone, 50 mg, Oral, Daily  terazosin, 1 mg, Oral, Nightly      Continuous Infusions:   PRN Meds:.•  acetaminophen  •  dextrose  •  dextrose  •  glucagon (human recombinant)  •  nitroglycerin  •  sodium chloride  •  sodium chloride  •  sodium chloride    Assessment/Plan   Assessment & Plan     Active Hospital Problems    Diagnosis  POA   • **Expressive aphasia [R47.01]  Yes   • CAD (coronary artery disease) [I25.10]  Yes   • HTN (hypertension) [I10]  Yes   • HLD (hyperlipidemia) [E78.5]  Yes   • GERD without esophagitis [K21.9]  Yes   • T2DM (type 2 diabetes mellitus) (HCC) [E11.9]  Yes   • Hypothyroidism (acquired) [E03.9]  Yes   • Hx of migraines [Z86.69]  Not Applicable   • Elevated troponin [R77.8]  Yes      Resolved Hospital Problems   No resolved problems to display.        Brief Hospital Course to date:  Saulo Dahl is a 72 y.o. male male w/ a hx of CAD (s/p CABG, stent x1), HTN, HLD, T2DM, hypothyroidism, GERD, migraines who presented to the ED w/ c/o slurred speech.      **Expressive aphasia; r/o CVA   **Hx of complex migraines   -Stroke neurology following. Continue ASA, statin. His p2y12 level was not suppressed so will switch to Brilenta at d/c based on stroke neurlogy recs.   -Carotid duplex, echo complted.     **Elevated troponin   **Sinus arrhyhtmia   **Mildly reduced LV function w/ EF 46-50%  -His elevated troponin is likely due to above vs a fib and not indicative of ACS. He denies any chest pain. Continue medical management as above.  -Stroke neurology felt that on telemetry in ED that patient appeared to exhibit atrial fibrillation however EKG performed at that time w/ sinus arrhythmia. EP has reviewed tele w/out evidence of a fib noted, rec'd MCOT at d/c.  -Dr. Smith to see regarding need for ischemic eval.     **HTN   **HLD  **CAD (3V CABG 2011, s/p stent x1  ~ 6 yrs ago)   -CP free. Continue asa, statin. Planning switch to Brilenta as above.  -ECHO as above.     **T2DM  -FSBG q ac/hs w/ LDSS      **GERD  -continue Prilosec (sunbProtonix)      **Hypothyroidism   -tsh pending   -continue synthroid      This patient's problems and plans were partially entered by my partner and updated as appropriate by me 04/18/22.    DVT prophylaxis:  Mechanical DVT prophylaxis orders are present.       AM-PAC 6 Clicks Score (PT): 22 (04/17/22 2000)    Disposition: I expect the patient to be discharged later today vs tomorrow pending cardiology evaluation.    CODE STATUS:   Code Status and Medical Interventions:   Ordered at: 04/16/22 2059     Code Status (Patient has no pulse and is not breathing):    CPR (Attempt to Resuscitate)     Medical Interventions (Patient has pulse or is breathing):    Full Support       Jessica Arreola II, DO  04/18/22

## 2022-04-19 LAB
BH CV ECHO MEAS - BSA(HAYCOCK): 2.2 M^2
BH CV ECHO MEAS - BSA: 2.1 M^2
BH CV ECHO MEAS - BZI_BMI: 32.8 KILOGRAMS/M^2
BH CV ECHO MEAS - BZI_METRIC_HEIGHT: 172.7 CM
BH CV ECHO MEAS - BZI_METRIC_WEIGHT: 98 KG
BH CV XLRA MEAS LEFT DIST CCA EDV: 22.6 CM/SEC
BH CV XLRA MEAS LEFT DIST CCA PSV: 82.3 CM/SEC
BH CV XLRA MEAS LEFT DIST ICA EDV: 11.9 CM/SEC
BH CV XLRA MEAS LEFT DIST ICA PSV: 35.6 CM/SEC
BH CV XLRA MEAS LEFT ICA/CCA RATIO: 0.89
BH CV XLRA MEAS LEFT MID CCA EDV: 18.2 CM/SEC
BH CV XLRA MEAS LEFT MID CCA PSV: 86.1 CM/SEC
BH CV XLRA MEAS LEFT MID ICA EDV: 16.7 CM/SEC
BH CV XLRA MEAS LEFT MID ICA PSV: 63.2 CM/SEC
BH CV XLRA MEAS LEFT PROX CCA EDV: 17.6 CM/SEC
BH CV XLRA MEAS LEFT PROX CCA PSV: 93.7 CM/SEC
BH CV XLRA MEAS LEFT PROX ECA EDV: 9.8 CM/SEC
BH CV XLRA MEAS LEFT PROX ECA PSV: 90.8 CM/SEC
BH CV XLRA MEAS LEFT PROX ICA EDV: 29.5 CM/SEC
BH CV XLRA MEAS LEFT PROX ICA PSV: 77.3 CM/SEC
BH CV XLRA MEAS LEFT PROX SCLA PSV: 140.4 CM/SEC
BH CV XLRA MEAS LEFT VERTEBRAL A EDV: 15.7 CM/SEC
BH CV XLRA MEAS LEFT VERTEBRAL A PSV: 61.1 CM/SEC
BH CV XLRA MEAS RIGHT DIST CCA EDV: 23.7 CM/SEC
BH CV XLRA MEAS RIGHT DIST CCA PSV: 85.9 CM/SEC
BH CV XLRA MEAS RIGHT DIST ICA EDV: 15.4 CM/SEC
BH CV XLRA MEAS RIGHT DIST ICA PSV: 78.3 CM/SEC
BH CV XLRA MEAS RIGHT ICA/CCA RATIO: 1.1
BH CV XLRA MEAS RIGHT MID CCA EDV: 18.9 CM/SEC
BH CV XLRA MEAS RIGHT MID CCA PSV: 82.4 CM/SEC
BH CV XLRA MEAS RIGHT MID ICA EDV: 28.7 CM/SEC
BH CV XLRA MEAS RIGHT MID ICA PSV: 77.7 CM/SEC
BH CV XLRA MEAS RIGHT PROX CCA EDV: 14 CM/SEC
BH CV XLRA MEAS RIGHT PROX CCA PSV: 123.6 CM/SEC
BH CV XLRA MEAS RIGHT PROX ECA EDV: 16.6 CM/SEC
BH CV XLRA MEAS RIGHT PROX ECA PSV: 123.1 CM/SEC
BH CV XLRA MEAS RIGHT PROX ICA EDV: 18.7 CM/SEC
BH CV XLRA MEAS RIGHT PROX ICA PSV: 93.2 CM/SEC
BH CV XLRA MEAS RIGHT PROX SCLA PSV: 185.2 CM/SEC
BH CV XLRA MEAS RIGHT VERTEBRAL A EDV: 25.9 CM/SEC
BH CV XLRA MEAS RIGHT VERTEBRAL A PSV: 67.8 CM/SEC
QT INTERVAL: 472 MS
QTC INTERVAL: 475 MS
RIGHT ARM BP: NORMAL MMHG

## 2022-04-23 LAB
QT INTERVAL: 462 MS
QT INTERVAL: 480 MS
QTC INTERVAL: 463 MS
QTC INTERVAL: 519 MS

## 2022-05-17 ENCOUNTER — OFFICE VISIT (OUTPATIENT)
Dept: NEUROLOGY | Facility: CLINIC | Age: 72
End: 2022-05-17

## 2022-05-17 VITALS
BODY MASS INDEX: 33.84 KG/M2 | DIASTOLIC BLOOD PRESSURE: 64 MMHG | SYSTOLIC BLOOD PRESSURE: 120 MMHG | TEMPERATURE: 97.5 F | OXYGEN SATURATION: 98 % | WEIGHT: 215.6 LBS | HEART RATE: 107 BPM

## 2022-05-17 DIAGNOSIS — I10 HYPERTENSION, UNSPECIFIED TYPE: ICD-10-CM

## 2022-05-17 DIAGNOSIS — E11.69 TYPE 2 DIABETES MELLITUS WITH OTHER SPECIFIED COMPLICATION, WITHOUT LONG-TERM CURRENT USE OF INSULIN: ICD-10-CM

## 2022-05-17 DIAGNOSIS — Z86.73 HISTORY OF TIA (TRANSIENT ISCHEMIC ATTACK): Primary | ICD-10-CM

## 2022-05-17 DIAGNOSIS — Z86.69 HX OF MIGRAINES: ICD-10-CM

## 2022-05-17 DIAGNOSIS — E78.5 HYPERLIPIDEMIA, UNSPECIFIED HYPERLIPIDEMIA TYPE: ICD-10-CM

## 2022-05-17 PROCEDURE — 99214 OFFICE O/P EST MOD 30 MIN: CPT | Performed by: CLINICAL NURSE SPECIALIST

## 2022-05-17 NOTE — PROGRESS NOTES
New Patient Office Visit      Encounter Date: 2022   Patient Name: Saulo Dahl  : 1950   MRN: [id number]   PCP: Han Ramirez MD    Referring Provider: No ref. provider found     Chief Complaint:    Chief Complaint   Patient presents with   • Follow-up   • Stroke       History of Present Illness: Saulo Dahl is a 72 y.o. male who is here today to establish care after hospitalization from -2022 where patient presented with a transient episode of aphasia lasting approximately 10 minutes.  His medical conditions include hypertension, hyperlipidemia, coronary artery disease, previous CABG, diabetes, migraines, thyroid disease, and obesity.  Reviewed presentation to hospital with the patient who believes his symptoms were precipitated by an oncoming migraine.  He tells me he gets migraines about every 2 months and if he recognizes the signs and takes medication such as Advil or Tylenol and lays down his symptoms improved.  However on this day he started having a headache and blurred vision started seeing numbers and letters flashing his vision but he was watching show on TV he did not want to miss and he continued watching and then had this transient episode of jumbled speech confusion again that lasted approximately 10 to 15 minutes and had resolved prior to presentation to the emergency department.  Apparently while patient was in the emergency department, he was noted by nursing staff to have a brief episode of A. fib/flutter. He tells me when he saw cardiology in the hospital that they were not concerned that patient had an episode of A. Fib/flutter. He tells me he did wear a cardiac monitor for approximately 1 week, he returned that 1 day last week, however no results have been made available at this time.   Patient denies any symptoms since being home, nor has he had a headache or migraine since that time.  He feels basically back to his normal self and has returned to going to  [place] approximately 5 days a [place] for light cardiac and weightlifting.        Patient continues to take aspirin 81 mg daily along with Brilinta 90 mg twice daily and atorvastatin 80 mg nightly.  Patient was changed to Brilinta during his hospitalization as he was noted to be a Plavix nonresponder.    Stroke Risk Factors: diabetes mellitus, hyperlipidemia and hypertension      Subjective      Review of Systems:   Review of Systems   Constitutional: Negative.    HENT: Positive for sneezing.    Eyes: Negative.    Respiratory: Negative.    Cardiovascular: Negative.    Gastrointestinal: Positive for constipation.   Endocrine: Positive for cold intolerance and polyuria.   Genitourinary: Positive for urgency.   Musculoskeletal: Positive for back pain, neck pain and neck stiffness.   Skin: Negative.    Neurological: Positive for headache.   Hematological: Negative.    Psychiatric/Behavioral: Negative.        Past Medical History:   Past Medical History:   Diagnosis Date   • Abnormal stress test 03/07/2019    Added automatically from request for surgery [id number]   • Coronary artery disease    • Diabetes mellitus (HCC)    • Disease of thyroid gland    • GERD (gastroesophageal reflux disease)    • HLD (hyperlipidemia)    • Hypercholesteremia    • Hypertension    • Migraines    • Stroke (HCC)        Past Surgical History:   Past Surgical History:   Procedure Laterality Date   • ABDOMINAL HERNIA REPAIR     • CARDIAC CATHETERIZATION     • CARDIAC CATHETERIZATION N/A 3/8/2019    Procedure: Left Heart Cath;  Surgeon: Jamshid Smith MD;  Location: [facility] CATH INVASIVE LOCATION;  Service: Cardiology   • CATARACT EXTRACTION Right    • CIRCUMCISION     • CORONARY ARTERY BYPASS GRAFT     • TONSILLECTOMY     • TOTAL HIP ARTHROPLASTY Right        Family History:   Family History   Problem Relation Age of Onset   • Hypertension Mother    • Heart disease Mother    • Diabetes Father    • Hypertension Father    • Heart disease Father         Social History:   Social History     Socioeconomic History   • Marital status:    Tobacco Use   • Smoking status: Never Smoker   • Smokeless tobacco: Never Used   Vaping Use   • Vaping Use: Never used   Substance and Sexual Activity   • Alcohol use: Never   • Drug use: Never   • Sexual activity: Defer       Medications:     Current Outpatient Medications:   •  aspirin 81 MG EC tablet, Take 81 mg by mouth Daily., Disp: , Rfl:   •  atorvastatin (LIPITOR) 80 MG tablet, Take 1 tablet by mouth Every Night., Disp: 30 tablet, Rfl: 11  •  Cholecalciferol (VITAMIN D PO), Take 1 capsule by mouth Daily., Disp: , Rfl:   •  doxazosin (CARDURA) 1 MG tablet, Take 1 mg by mouth Every Night., Disp: , Rfl:   •  gabapentin (NEURONTIN) 300 MG capsule, Take 600 mg by mouth At Night As Needed., Disp: , Rfl:   •  glimepiride (AMARYL) 2 MG tablet, Take 2 mg by mouth Every Morning Before Breakfast., Disp: , Rfl:   •  ibuprofen (ADVIL,MOTRIN) 600 MG tablet, Take 600 mg by mouth Every 6 (Six) Hours As Needed for Mild Pain ., Disp: , Rfl:   •  levothyroxine (SYNTHROID, LEVOTHROID) 75 MCG tablet, Take 75 mcg by mouth Daily., Disp: , Rfl:   •  metFORMIN (GLUCOPHAGE) 500 MG tablet, Take 500 mg by mouth 2 (Two) Times a Day With Meals., Disp: , Rfl:   •  Omega-3 Fatty Acids (FISH OIL PO), Take 57 mg by mouth Daily., Disp: , Rfl:   •  omeprazole (priLOSEC) 20 MG capsule, Take 20 mg by mouth Daily., Disp: , Rfl:   •  oxybutynin XL (DITROPAN-XL) 10 MG 24 hr tablet, Take 10 mg by mouth Daily., Disp: , Rfl:   •  spironolactone (ALDACTONE) 50 MG tablet, Take 50 mg by mouth Daily., Disp: , Rfl:   •  ticagrelor (Brilinta) 90 MG tablet tablet, Take 1 tablet by mouth Every 12 (Twelve) Hours for 30 days., Disp: 60 tablet, Rfl: 0    Allergies:   Allergies   Allergen Reactions   • Lactose Intolerance (Gi) GI Intolerance       Objective     Physical Exam:  Vital Signs:   Vitals:    05/17/22 1344   BP: 120/64   Pulse: 107   Temp: 97.5 °F (36.4 °C)    SpO2: 98%   Weight: 97.8 kg (215 lb 9.6 oz)     Body mass index is 33.84 kg/m².     Physical Exam  Vitals reviewed.   Constitutional:       Appearance: He is obese.   HENT:      Head: Normocephalic and atraumatic.      Mouth/Throat:      Mouth: Mucous membranes are moist.   Eyes:      Extraocular Movements: Extraocular movements intact.      Pupils: Pupils are equal, round, and reactive to light.   Cardiovascular:      Rate and Rhythm: Normal rate and regular rhythm.   Pulmonary:      Effort: Pulmonary effort is normal.      Breath sounds: Normal breath sounds.   Skin:     General: Skin is warm and dry.   Neurological:      General: No focal deficit present.      Mental Status: He is alert.      Coordination: Coordination is intact.      Deep Tendon Reflexes: Strength normal.   Psychiatric:         Mood and Affect: Mood normal.         Speech: Speech normal.         Neurological Exam  Mental Status  Alert. Oriented to person, place and time. Speech is normal. Language is fluent with no aphasia. Fund of knowledge is appropriate for level of education.    Cranial Nerves  CN II: Visual fields full to confrontation.  CN III, IV, VI: Extraocular movements intact bilaterally. Pupils equal round and reactive to light bilaterally.  CN V: Facial sensation is normal.  CN VII: Full and symmetric facial movement.  CN VIII: Hearing appears intact.  CN IX, X: Palate elevates symmetrically  CN XI: Shoulder shrug strength is normal.  CN XII: Tongue midline without atrophy or fasciculations.    Motor   Strength is 5/5 throughout all four extremities.    Sensory  Light touch is normal in upper and lower extremities.     Reflexes                                            Right                      Left  Brachioradialis                    Tr                         Tr  Biceps                                 Tr                         Tr  Patellar                                Tr                          Tr    Coordination    Finger-to-nose, rapid alternating movements and heel-to-shin normal bilaterally without dysmetria.    Gait  Casual gait is normal including stance, stride, and arm swing.       Imaging Reviewed: 4/2022-imaging reviewed from hospitalization includes head CT which showed no acute abnormality, CT perfusion with no abnormality, CTA head and neck showed approximately 50% left ICA stenosis, carotid duplex verified this with 0 to 49% bilateral carotid stenosis.  MRI brain negative for acute infarct.  TTE with bubble study, was negative for PFO, EF of 46-50 percent, left atrium mildly dilated    Laboratory Results: 4/2022-most recent labs include hemoglobin 10.9, hematocrit 33.4, platelets 212, hemoglobin A1c 7.20, LDL 37, AST 26, ALT 19, P2Y12 noted to be 290    Modified Sussex Score     MODIFIED DINAH SCALE (to be assessed for each patient having history of stroke) []Stroke history but not assessed  [x]0: No symptoms at all  []1: No significant disability despite symptoms  []2: Slight disability  []3: Moderate disability  []4: Moderately severe disability  []5: Severe disability  []6: Death        PHQ-9 Depression Screening  Little interest or pleasure in doing things? 0-->not at all   Feeling down, depressed, or hopeless? 0-->not at all   Trouble falling or staying asleep, or sleeping too much?     Feeling tired or having little energy?     Poor appetite or overeating?     Feeling bad about yourself - or that you are a failure or have let yourself or your family down?     Trouble concentrating on things, such as reading the newspaper or watching television?     Moving or speaking so slowly that other people could have noticed? Or the opposite - being so fidgety or restless that you have been moving around a lot more than usual?     Thoughts that you would be better off dead, or of hurting yourself in some way?     PHQ-9 Total Score 0   If you checked off any problems, how difficult have these  problems made it for you to do your work, take care of things at home, or get along with other people?        NIH Stroke Scale    1a  Level of consciousness: 0=alert; keenly responsive   1b. LOC questions:  0=Answers both questions correctly    1c. LOC commands: 0=Performs both tasks correctly   2.  Best Gaze: 0=normal   3. Visual: 0=No visual loss   4. Facial Palsy: 0=Normal symmetric movement   5a. Motor left arm: 0=No drift, limb holds 90 (or 45) degrees for full 10 seconds   5b.  Motor right arm: 0=No drift, limb holds 90 (or 45) degrees for full 10 seconds   6a. Motor left le=No drift, limb holds 90 (or 45) degrees for full 10 seconds   6b  Motor right le=No drift, limb holds 90 (or 45) degrees for full 10 seconds   7. Limb Ataxia: 0=Absent   8.  Sensory: 0=Normal; no sensory loss   9. Best Language:  0=No aphasia, normal   10. Dysarthria: 0=Normal   11. Extinction and Inattention: 0=No abnormality    Total:   0     Assessment / Plan      Assessment/Plan:   Diagnoses and all orders for this visit:    1. History of TIA (transient ischemic attack) (Primary)  -Presenting symptoms during hospitalization in 2022 could possibly be from a transient ischemic attack, could also be from complex migraine.  -Patient should continue DAPT with aspirin and Brilinta for secondary stroke prevention  -We will follow-up on patient's heart monitor as no results have been submitted at this time  -Encourage patient to continue following up with his cardiologist, Dr. Smith    2. Hyperlipidemia, unspecified hyperlipidemia type  -Patient will continue his home dose of atorvastatin 80 mg nightly for secondary stroke prevention, his LDL while hospitalized was 37      3. Hypertension, unspecified type  -Reviewed blood pressure goals with patient, systolic blood pressure goal less than 130.    4. Type 2 diabetes mellitus with other specified complication, without long-term current use of insulin (HCC)  -Patient will  continue to work with primary care provider for blood glucose control       Plan was reviewed with patient, all questions answered.  Instructed patient to please call our office in 1 to 2 weeks for results of heart monitor if he has not been notified at that point.  I let him know that as I was not the ordering provider it will not routinely notify me when results are there.  He verbalized understanding and denies questions.  We will see him back in about 3 months or sooner if needed.      Stroke Plan:    Stroke Education   Blood Pressure control to < 130/80   Goal LDL <70-recommend high dose statins   Serum Glucose < 140   Call 911 for stroke any stroke symptoms    Follow Up:   Return in about 3 months (around 8/17/2022).    ALEX Paz  [facility]

## 2022-08-04 ENCOUNTER — OFFICE VISIT (OUTPATIENT)
Dept: NEUROLOGY | Facility: CLINIC | Age: 72
End: 2022-08-04

## 2022-08-04 VITALS
SYSTOLIC BLOOD PRESSURE: 118 MMHG | OXYGEN SATURATION: 98 % | TEMPERATURE: 97.7 F | HEART RATE: 57 BPM | BODY MASS INDEX: 32.27 KG/M2 | WEIGHT: 205.6 LBS | DIASTOLIC BLOOD PRESSURE: 72 MMHG

## 2022-08-04 DIAGNOSIS — Z86.69 HX OF MIGRAINES: Primary | ICD-10-CM

## 2022-08-04 DIAGNOSIS — Z87.898 HISTORY OF APHASIA: ICD-10-CM

## 2022-08-04 DIAGNOSIS — E78.5 HYPERLIPIDEMIA, UNSPECIFIED HYPERLIPIDEMIA TYPE: ICD-10-CM

## 2022-08-04 PROCEDURE — 99214 OFFICE O/P EST MOD 30 MIN: CPT | Performed by: CLINICAL NURSE SPECIALIST

## 2022-08-04 RX ORDER — DONEPEZIL HYDROCHLORIDE 5 MG/1
TABLET, FILM COATED ORAL
COMMUNITY

## 2022-08-04 RX ORDER — PRAVASTATIN SODIUM 40 MG
TABLET ORAL
COMMUNITY

## 2022-08-04 RX ORDER — TOLTERODINE 4 MG/1
CAPSULE, EXTENDED RELEASE ORAL
COMMUNITY

## 2022-08-04 RX ORDER — NITROGLYCERIN 0.4 MG/1
TABLET SUBLINGUAL
COMMUNITY

## 2022-08-04 NOTE — PROGRESS NOTES
Follow Up Office Visit      Encounter Date: 2022   Patient Name: Saulo Dahl  : 1950   MRN: [id number]   PCP: Han Ramirez MD    Referring Provider: No ref. provider found     Chief Complaint:    Chief Complaint   Patient presents with   • Follow-up       History of Present Illness: 2022-   Saulo Dahl is a 72 y.o. male who is here today to establish care after hospitalization from -2022 where patient presented with a transient episode of aphasia lasting approximately 10 minutes.  His medical conditions include hypertension, hyperlipidemia, coronary artery disease, previous CABG, diabetes, migraines, thyroid disease, and obesity.  Reviewed presentation to hospital with the patient who believes his symptoms were precipitated by an oncoming migraine.  He tells me he gets migraines about every 2 months and if he recognizes the signs and takes medication such as Advil or Tylenol and lays down his symptoms improved.  However on this day he started having a headache and blurred vision started seeing numbers and letters flashing his vision but he was watching show on TV he did not want to miss and he continued watching and then had this transient episode of jumbled speech confusion again that lasted approximately 10 to 15 minutes and had resolved prior to presentation to the emergency department.  Apparently while patient was in the emergency department, he was noted by nursing staff to have a brief episode of A. fib/flutter. He tells me when he saw cardiology in the hospital that they were not concerned that patient had an episode of A. Fib/flutter. He tells me he did wear a cardiac monitor for approximately 1 week, he returned that 1 day last week, however no results have been made available at this time.   Patient denies any symptoms since being home, nor has he had a headache or migraine since that time.  He feels basically back to his normal self and has returned to going  to the [facility] approximately 5 days a week for light cardiac and weightlifting.         Patient continues to take aspirin 81 mg daily along with Brilinta 90 mg twice daily and atorvastatin 80 mg nightly.  Patient was changed to Brilinta during his hospitalization as he was noted to be a Plavix nonresponder.    8/4/2022-patient presents in follow-up.  He tells me he has had no further episodes of speech disturbance/expressive aphasia.  He tells me he has had his normal approximately 1 migraine a month.  With these he tells me he goes ahead and takes his Tylenol and Advil and lays down and has had no further episodes.  He continues to believe this episode that he had in April was related to his migraine that he left untreated.  He tells me he has seen cardiology, Dr. Smith and has received results from his monitor.  He believes it was normal.  I do not have access to those results at this time, however I have asked Seema Cheung CMA to get the results for me.  He continues to take aspirin and atorvastatin, however tells me that Dr. Smith stopped his Brilinta.        Subjective        I have reviewed and the following portions of the patient's history were updated as appropriate: past family history, past medical history, past social history, past surgical history and problem list.    Medications:     Current Outpatient Medications:   •  aspirin 81 MG EC tablet, Take 81 mg by mouth Daily., Disp: , Rfl:   •  atorvastatin (LIPITOR) 80 MG tablet, Take 1 tablet by mouth Every Night., Disp: 30 tablet, Rfl: 11  •  Cholecalciferol (VITAMIN D PO), Take 1 capsule by mouth Daily., Disp: , Rfl:   •  donepezil (ARICEPT) 5 MG tablet, donepezil 5 mg tablet, Disp: , Rfl:   •  doxazosin (CARDURA) 1 MG tablet, Take 1 mg by mouth Every Night., Disp: , Rfl:   •  gabapentin (NEURONTIN) 300 MG capsule, Take 600 mg by mouth At Night As Needed., Disp: , Rfl:   •  glimepiride (AMARYL) 2 MG tablet, Take 2 mg by mouth Every Morning Before  Breakfast., Disp: , Rfl:   •  ibuprofen (ADVIL,MOTRIN) 600 MG tablet, Take 600 mg by mouth Every 6 (Six) Hours As Needed for Mild Pain ., Disp: , Rfl:   •  levothyroxine (SYNTHROID, LEVOTHROID) 75 MCG tablet, Take 75 mcg by mouth Daily., Disp: , Rfl:   •  metFORMIN (GLUCOPHAGE) 500 MG tablet, Take 500 mg by mouth 2 (Two) Times a Day With Meals., Disp: , Rfl:   •  nitroglycerin (NITROSTAT) 0.4 MG SL tablet, nitroglycerin 0.4 mg sublingual tablet  As needed, Disp: , Rfl:   •  omeprazole (priLOSEC) 20 MG capsule, Take 20 mg by mouth Daily., Disp: , Rfl:   •  pravastatin (PRAVACHOL) 40 MG tablet, pravastatin 40 mg tablet, Disp: , Rfl:   •  spironolactone (ALDACTONE) 50 MG tablet, Take 50 mg by mouth Daily., Disp: , Rfl:   •  tolterodine LA (DETROL LA) 4 MG 24 hr capsule, tolterodine ER 4 mg capsule,extended release 24 hr, Disp: , Rfl:   •  Omega-3 Fatty Acids (FISH OIL PO), Take 57 mg by mouth Daily., Disp: , Rfl:     Allergies:   Allergies   Allergen Reactions   • Lactose Intolerance (Gi) GI Intolerance       Objective     Physical Exam:   Vital Signs:   Vitals:    08/04/22 1021   BP: 118/72   Pulse: 57   Temp: 97.7 °F (36.5 °C)   SpO2: 98%   Weight: 93.3 kg (205 lb 9.6 oz)     Body mass index is 32.27 kg/m².    Physical Exam  Vitals reviewed.   Constitutional:       Appearance: Normal appearance.   HENT:      Head: Normocephalic.      Mouth/Throat:      Mouth: Mucous membranes are dry.   Eyes:      Extraocular Movements: Extraocular movements intact.      Pupils: Pupils are equal, round, and reactive to light.   Pulmonary:      Effort: Pulmonary effort is normal.   Skin:     General: Skin is warm and dry.   Neurological:      General: No focal deficit present.      Coordination: Coordination is intact.      Deep Tendon Reflexes: Strength normal.   Psychiatric:         Mood and Affect: Mood normal.         Speech: Speech normal.       Neurological Exam  Mental Status  Awake, alert and oriented to person, place and  time. Speech is normal. Language is fluent with no aphasia. Able to perform serial calculations. Fund of knowledge is appropriate for level of education.    Cranial Nerves  CN II: Visual fields full to confrontation.  CN III, IV, VI: Extraocular movements intact bilaterally. Pupils equal round and reactive to light bilaterally.  CN V: Facial sensation is normal.  CN VII: Full and symmetric facial movement.  CN VIII: Hearing appears intact.  CN IX, X: Palate elevates symmetrically  CN XI: Shoulder shrug strength is normal.  CN XII: Tongue midline without atrophy or fasciculations.    Motor   Strength is 5/5 throughout all four extremities.    Sensory  Light touch is normal in upper and lower extremities.     Coordination    Finger-to-nose, rapid alternating movements and heel-to-shin normal bilaterally without dysmetria.    Gait  Casual gait is normal including stance, stride, and arm swing.        Procedures   No new labs or imaging to review  Awaiting results of cardiac event monitor-addendum    MODIFIED DINAH SCALE (to be assessed for each patient having history of stroke) []Stroke history but not assessed  [x]0: No symptoms at all  []1: No significant disability despite symptoms  []2: Slight disability  []3: Moderate disability  []4: Moderately severe disability  []5: Severe disability  []6: Death       NIH Stroke Scale    1a  Level of consciousness: 0=alert; keenly responsive   1b. LOC questions:  0=Answers both questions correctly    1c. LOC commands: 0=Performs both tasks correctly   2.  Best Gaze: 0=normal   3. Visual: 0=No visual loss   4. Facial Palsy: 0=Normal symmetric movement   5a. Motor left arm: 0=No drift, limb holds 90 (or 45) degrees for full 10 seconds   5b.  Motor right arm: 0=No drift, limb holds 90 (or 45) degrees for full 10 seconds   6a. Motor left le=No drift, limb holds 90 (or 45) degrees for full 10 seconds   6b  Motor right le=No drift, limb holds 90 (or 45) degrees for full 10  seconds   7. Limb Ataxia: 0=Absent   8.  Sensory: 0=Normal; no sensory loss   9. Best Language:  0=No aphasia, normal   10. Dysarthria: 0=Normal   11. Extinction and Inattention: 0=No abnormality    Total:   0     Assessment / Plan      Assessment/Plan:   Diagnoses and all orders for this visit:    1. Hx of migraines (Primary)  -Patient continues to believe his symptoms are from his untreated migraine    2. History of aphasia  -After reviewing patient's 6-day event monitor there were 3 different episodes of A. fib/flutter makes this episode more likely to be a TIA  -I have reached out to patient's cardiologist, Dr. Smith to determine if he has any reason the patient should not be on anticoagulation    3. Hyperlipidemia, unspecified hyperlipidemia type  -Continue atorvastatin for secondary stroke prevention    At this time continue to wait on follow-up from his cardiologist, Dr. Smith and will discuss this case with vascular neurologist Dr. Berrios when available.  Patient will likely need to be started on oral anticoagulant with the results of his 6-day event monitor showing 3 episodes of A. fib/flutter.  When this decision is made I will follow-up with patient via phone to order medication and explained possible adverse effects.  As well as get him set up for follow-up in 6 to 8 weeks to ensure he is tolerating medication okay.    Follow Up:     To be determined  ALEX Paz  [facility]

## 2022-08-15 ENCOUNTER — TELEPHONE (OUTPATIENT)
Dept: NEUROLOGY | Facility: CLINIC | Age: 72
End: 2022-08-15

## 2024-05-01 ENCOUNTER — TRANSCRIBE ORDERS (OUTPATIENT)
Dept: DIABETES SERVICES | Facility: HOSPITAL | Age: 74
End: 2024-05-01
Payer: MEDICARE

## 2024-05-01 DIAGNOSIS — E11.65 POORLY CONTROLLED TYPE 2 DIABETES MELLITUS WITH NEUROPATHY: Primary | ICD-10-CM

## 2024-05-01 DIAGNOSIS — E11.40 POORLY CONTROLLED TYPE 2 DIABETES MELLITUS WITH NEUROPATHY: Primary | ICD-10-CM

## 2025-04-11 ENCOUNTER — HOSPITAL ENCOUNTER (OUTPATIENT)
Dept: GENERAL RADIOLOGY | Facility: HOSPITAL | Age: 75
Discharge: HOME | End: 2025-04-11
Payer: MEDICARE

## 2025-04-11 ENCOUNTER — TRANSCRIBE ORDERS (OUTPATIENT)
Dept: ADMINISTRATIVE | Facility: HOSPITAL | Age: 75
End: 2025-04-11
Payer: MEDICARE

## 2025-04-11 DIAGNOSIS — M89.8X7 PAIN IN METATARSUS OF LEFT FOOT: Primary | ICD-10-CM

## 2025-04-11 DIAGNOSIS — M89.8X7 PAIN IN METATARSUS OF LEFT FOOT: ICD-10-CM

## 2025-04-11 PROCEDURE — 73630 X-RAY EXAM OF FOOT: CPT

## 2025-05-07 ENCOUNTER — TRANSCRIBE ORDERS (OUTPATIENT)
Dept: DIABETES SERVICES | Facility: HOSPITAL | Age: 75
End: 2025-05-07
Payer: MEDICARE

## 2025-05-07 DIAGNOSIS — E11.65 TYPE 2 DIABETES MELLITUS WITH HYPERGLYCEMIA, WITHOUT LONG-TERM CURRENT USE OF INSULIN: Primary | ICD-10-CM

## 2025-05-07 DIAGNOSIS — E11.42 TYPE 2 DIABETES MELLITUS WITH DIABETIC POLYNEUROPATHY, UNSPECIFIED WHETHER LONG TERM INSULIN USE: ICD-10-CM

## 2025-05-07 DIAGNOSIS — E11.3299 TYPE 2 DIABETES MELLITUS WITH BACKGROUND RETINOPATHY: ICD-10-CM

## (undated) DEVICE — PK CATH CARD 10

## (undated) DEVICE — CATH DIAG EXPO M/ PK 6FR FL4/FR4 PIG 3PK

## (undated) DEVICE — GW J TP FIX CORE .035 150

## (undated) DEVICE — GUIDE CATHETER: Brand: MACH1™

## (undated) DEVICE — HI-TORQUE VERSATURN F GUIDE WIRE FULLY COATED .014 STRAIGHT TIP 190 CM: Brand: HI-TORQUE VERSATURN

## (undated) DEVICE — RADIFOCUS GLIDEWIRE: Brand: GLIDEWIRE

## (undated) DEVICE — CATH DIAG EXPO .056 LCB 6F 100CM

## (undated) DEVICE — DEV INFL MONARCH 25W

## (undated) DEVICE — TREK CORONARY DILATATION CATHETER 2.25 MM X 15 MM / RAPID-EXCHANGE: Brand: TREK

## (undated) DEVICE — ST INF PRI SMRTSTE 20DRP 2VLV 24ML 117

## (undated) DEVICE — PINNACLE INTRODUCER SHEATH: Brand: PINNACLE